# Patient Record
Sex: MALE | Race: WHITE | NOT HISPANIC OR LATINO | Employment: FULL TIME | URBAN - METROPOLITAN AREA
[De-identification: names, ages, dates, MRNs, and addresses within clinical notes are randomized per-mention and may not be internally consistent; named-entity substitution may affect disease eponyms.]

---

## 2017-01-20 ENCOUNTER — ALLSCRIPTS OFFICE VISIT (OUTPATIENT)
Dept: OTHER | Facility: OTHER | Age: 37
End: 2017-01-20

## 2017-01-20 DIAGNOSIS — N50.819 PAIN IN TESTICLE: ICD-10-CM

## 2017-01-20 LAB
BILIRUB UR QL STRIP: NORMAL
CLARITY UR: NORMAL
COLOR UR: CLEAR
GLUCOSE (HISTORICAL): NORMAL
HGB UR QL STRIP.AUTO: NORMAL
KETONES UR STRIP-MCNC: NORMAL MG/DL
LEUKOCYTE ESTERASE UR QL STRIP: NORMAL
NITRITE UR QL STRIP: NORMAL
PH UR STRIP.AUTO: 6.5 [PH]
PROT UR STRIP-MCNC: NORMAL MG/DL
SP GR UR STRIP.AUTO: 1

## 2017-01-23 ENCOUNTER — HOSPITAL ENCOUNTER (OUTPATIENT)
Dept: RADIOLOGY | Facility: HOSPITAL | Age: 37
Discharge: HOME | End: 2017-01-23
Payer: COMMERCIAL

## 2017-01-23 DIAGNOSIS — N50.819 PAIN IN TESTICLE: ICD-10-CM

## 2017-01-23 PROCEDURE — 76870 US EXAM SCROTUM: CPT

## 2017-02-09 ENCOUNTER — APPOINTMENT (OUTPATIENT)
Dept: PHYSICAL THERAPY | Facility: CLINIC | Age: 37
End: 2017-02-09
Payer: COMMERCIAL

## 2017-02-09 PROCEDURE — 97162 PT EVAL MOD COMPLEX 30 MIN: CPT

## 2017-02-09 PROCEDURE — L3010 FOOT LONGITUDINAL ARCH SUPPO: HCPCS

## 2017-06-07 ENCOUNTER — APPOINTMENT (OUTPATIENT)
Dept: LAB | Facility: CLINIC | Age: 37
End: 2017-06-07
Payer: COMMERCIAL

## 2017-06-07 ENCOUNTER — TRANSCRIBE ORDERS (OUTPATIENT)
Dept: LAB | Facility: CLINIC | Age: 37
End: 2017-06-07

## 2017-06-07 DIAGNOSIS — Z00.8 HEALTH EXAMINATION IN POPULATION SURVEY: Primary | ICD-10-CM

## 2017-06-07 LAB
CHOLEST SERPL-MCNC: 122 MG/DL (ref 50–200)
EST. AVERAGE GLUCOSE BLD GHB EST-MCNC: 103 MG/DL
HBA1C MFR BLD: 5.2 % (ref 4.2–6.3)
HDLC SERPL-MCNC: 59 MG/DL (ref 40–60)
LDLC SERPL CALC-MCNC: 55 MG/DL (ref 0–100)
TRIGL SERPL-MCNC: 42 MG/DL

## 2017-06-07 PROCEDURE — 80061 LIPID PANEL: CPT

## 2017-06-07 PROCEDURE — 83036 HEMOGLOBIN GLYCOSYLATED A1C: CPT

## 2017-06-07 PROCEDURE — 36415 COLL VENOUS BLD VENIPUNCTURE: CPT

## 2017-09-01 DIAGNOSIS — N50.819 PAIN IN TESTICLE: ICD-10-CM

## 2018-01-13 VITALS
WEIGHT: 192.38 LBS | HEART RATE: 64 BPM | HEIGHT: 73 IN | DIASTOLIC BLOOD PRESSURE: 82 MMHG | BODY MASS INDEX: 25.5 KG/M2 | SYSTOLIC BLOOD PRESSURE: 120 MMHG

## 2018-07-06 ENCOUNTER — TRANSCRIBE ORDERS (OUTPATIENT)
Dept: LAB | Facility: CLINIC | Age: 38
End: 2018-07-06

## 2018-07-06 ENCOUNTER — APPOINTMENT (OUTPATIENT)
Dept: LAB | Facility: CLINIC | Age: 38
End: 2018-07-06
Payer: COMMERCIAL

## 2018-07-06 DIAGNOSIS — Z00.8 HEALTH EXAMINATION IN POPULATION SURVEY: Primary | ICD-10-CM

## 2018-07-06 DIAGNOSIS — Z00.8 HEALTH EXAMINATION IN POPULATION SURVEY: ICD-10-CM

## 2018-07-06 LAB
CHOLEST SERPL-MCNC: 154 MG/DL (ref 50–200)
HDLC SERPL-MCNC: 65 MG/DL (ref 40–60)
LDLC SERPL CALC-MCNC: 79 MG/DL (ref 0–100)
NONHDLC SERPL-MCNC: 89 MG/DL
TRIGL SERPL-MCNC: 52 MG/DL

## 2018-07-06 PROCEDURE — 80061 LIPID PANEL: CPT

## 2018-07-06 PROCEDURE — 83036 HEMOGLOBIN GLYCOSYLATED A1C: CPT

## 2018-07-06 PROCEDURE — 36415 COLL VENOUS BLD VENIPUNCTURE: CPT

## 2018-07-07 LAB
EST. AVERAGE GLUCOSE BLD GHB EST-MCNC: 103 MG/DL
HBA1C MFR BLD: 5.2 % (ref 4.2–6.3)

## 2018-11-05 ENCOUNTER — OFFICE VISIT (OUTPATIENT)
Dept: URGENT CARE | Facility: CLINIC | Age: 38
End: 2018-11-05
Payer: COMMERCIAL

## 2018-11-05 VITALS
DIASTOLIC BLOOD PRESSURE: 66 MMHG | WEIGHT: 194 LBS | HEIGHT: 74 IN | OXYGEN SATURATION: 99 % | HEART RATE: 76 BPM | RESPIRATION RATE: 16 BRPM | TEMPERATURE: 99.9 F | SYSTOLIC BLOOD PRESSURE: 120 MMHG | BODY MASS INDEX: 24.9 KG/M2

## 2018-11-05 DIAGNOSIS — J02.9 ACUTE PHARYNGITIS, UNSPECIFIED ETIOLOGY: Primary | ICD-10-CM

## 2018-11-05 LAB — S PYO AG THROAT QL: NEGATIVE

## 2018-11-05 PROCEDURE — 99213 OFFICE O/P EST LOW 20 MIN: CPT | Performed by: PHYSICIAN ASSISTANT

## 2018-11-05 PROCEDURE — S9088 SERVICES PROVIDED IN URGENT: HCPCS | Performed by: PHYSICIAN ASSISTANT

## 2018-11-05 PROCEDURE — 87070 CULTURE OTHR SPECIMN AEROBIC: CPT | Performed by: PHYSICIAN ASSISTANT

## 2018-11-05 NOTE — PATIENT INSTRUCTIONS
Rapid strep negative in office, will send for culture and call if positive  Tylenol, ibuprofen, tea with honey, and plenty of fluids  Follow up with your PCP for persistent symptoms  Go to the ER for any distress

## 2018-11-05 NOTE — PROGRESS NOTES
[facility]        NAME: Zeyad Neri is a 41 y o  male  : 1980    MRN: [id number]  DATE: 2018  TIME: 3:47 PM    Assessment and Plan   Acute pharyngitis, unspecified etiology [J02 9]  1  Acute pharyngitis, unspecified etiology  POCT rapid strepA    Throat culture         Patient Instructions     Rapid strep negative in office, will send for culture and call if positive  Tylenol, ibuprofen, tea with honey, and plenty of fluids  Follow up with PCP in 3-5 days  Proceed to  ER if symptoms worsen  Chief Complaint     Chief Complaint   Patient presents with    Sore Throat     began 3 days ago; has taken no meds for symptom relief         History of Present Illness       This is a 41year old male presenting for sore throat x 3 days  Mild sinus congestion and dry cough, no shortness of breath or fevers  + hoarse voice  He has been taking zicam and tea with honey for his symptoms  His daughter was recently ill with a sore throat that resolved spontaneously in a few days  Review of Systems   Review of Systems   Constitutional: Positive for fatigue  Negative for fever  HENT: Positive for congestion, rhinorrhea and sore throat  Respiratory: Positive for cough  Current Medications     No current outpatient prescriptions on file  Current Allergies     Allergies as of 2018    (No Known Allergies)            The following portions of the patient's history were reviewed and updated as appropriate: allergies, current medications, past family history, past medical history, past social history, past surgical history and problem list      No past medical history on file  No past surgical history on file  No family history on file  Medications have been verified          Objective   /66   Pulse 76   Temp 99 9 °F (37 7 °C)   Resp 16   Ht 6' 2" (1 88 m)   Wt 88 kg (194 lb)   SpO2 99%   BMI 24 91 kg/m²        Physical Exam     Physical Exam Constitutional: He appears well-developed and well-nourished  No distress  HENT:   Head: Normocephalic  Right Ear: Tympanic membrane, external ear and ear canal normal    Left Ear: Tympanic membrane, external ear and ear canal normal    Nose: Nose normal    Mouth/Throat: Uvula is midline and mucous membranes are normal  Posterior oropharyngeal erythema present  No oropharyngeal exudate or posterior oropharyngeal edema  Eyes: Pupils are equal, round, and reactive to light  Conjunctivae and EOM are normal    Neck: Normal range of motion  Neck supple  Cardiovascular: Normal rate, regular rhythm, normal heart sounds and intact distal pulses  Pulmonary/Chest: Effort normal and breath sounds normal  No respiratory distress  He has no decreased breath sounds  He has no wheezes  He has no rhonchi  He has no rales  Lymphadenopathy:     He has no cervical adenopathy  Neurological: He is alert  Skin: Skin is warm and dry  He is not diaphoretic  Nursing note and vitals reviewed

## 2018-11-05 NOTE — LETTER
November 5, 2018     Patient: Donnie Jolly   YOB: 1980   Date of Visit: 11/5/2018       To Whom it May Concern:    Fartun Guerra was seen in my clinic on 11/5/2018  He may return to work on 11/05/2018  If you have any questions or concerns, please don't hesitate to call           Sincerely,          Girish Villareal PA-C        CC: No Recipients

## 2018-11-05 NOTE — LETTER
November 5, 2018     Patient: Liudmila Corrales   YOB: 1980   Date of Visit: 11/5/2018       To Whom it May Concern:    Luana Velasquez was seen in my clinic on 11/5/2018  He may return to work on 11/06/2018  If you have any questions or concerns, please don't hesitate to call           Sincerely,          Hamilton Montiel PA-C        CC: No Recipients

## 2018-11-07 LAB — BACTERIA THROAT CULT: NORMAL

## 2020-05-29 ENCOUNTER — OFFICE VISIT (OUTPATIENT)
Dept: FAMILY MEDICINE CLINIC | Facility: CLINIC | Age: 40
End: 2020-05-29
Payer: COMMERCIAL

## 2020-05-29 VITALS
WEIGHT: 189 LBS | DIASTOLIC BLOOD PRESSURE: 80 MMHG | TEMPERATURE: 97.4 F | HEART RATE: 96 BPM | RESPIRATION RATE: 14 BRPM | HEIGHT: 73 IN | BODY MASS INDEX: 25.05 KG/M2 | SYSTOLIC BLOOD PRESSURE: 110 MMHG

## 2020-05-29 DIAGNOSIS — Z13.220 SCREENING, LIPID: ICD-10-CM

## 2020-05-29 DIAGNOSIS — Z00.00 PE (PHYSICAL EXAM), ANNUAL: Primary | ICD-10-CM

## 2020-05-29 DIAGNOSIS — Z13.1 SCREENING FOR DIABETES MELLITUS (DM): ICD-10-CM

## 2020-05-29 DIAGNOSIS — R53.83 FATIGUE, UNSPECIFIED TYPE: ICD-10-CM

## 2020-05-29 PROCEDURE — 99395 PREV VISIT EST AGE 18-39: CPT | Performed by: NURSE PRACTITIONER

## 2020-06-10 ENCOUNTER — TELEPHONE (OUTPATIENT)
Dept: FAMILY MEDICINE CLINIC | Facility: CLINIC | Age: 40
End: 2020-06-10

## 2020-12-21 ENCOUNTER — IMMUNIZATIONS (OUTPATIENT)
Dept: FAMILY MEDICINE CLINIC | Facility: HOSPITAL | Age: 40
End: 2020-12-21
Payer: COMMERCIAL

## 2020-12-21 DIAGNOSIS — Z23 ENCOUNTER FOR IMMUNIZATION: ICD-10-CM

## 2020-12-21 PROCEDURE — 0001A SARS-COV-2 / COVID-19 MRNA VACCINE (PFIZER-BIONTECH) 30 MCG: CPT

## 2020-12-21 PROCEDURE — 91300 SARS-COV-2 / COVID-19 MRNA VACCINE (PFIZER-BIONTECH) 30 MCG: CPT

## 2021-01-09 ENCOUNTER — IMMUNIZATIONS (OUTPATIENT)
Dept: FAMILY MEDICINE CLINIC | Facility: HOSPITAL | Age: 41
End: 2021-01-09

## 2021-01-09 DIAGNOSIS — Z23 ENCOUNTER FOR IMMUNIZATION: ICD-10-CM

## 2021-01-09 PROCEDURE — 0002A SARS-COV-2 / COVID-19 MRNA VACCINE (PFIZER-BIONTECH) 30 MCG: CPT

## 2021-01-09 PROCEDURE — 91300 SARS-COV-2 / COVID-19 MRNA VACCINE (PFIZER-BIONTECH) 30 MCG: CPT

## 2021-04-30 ENCOUNTER — OFFICE VISIT (OUTPATIENT)
Dept: FAMILY MEDICINE CLINIC | Facility: CLINIC | Age: 41
End: 2021-04-30
Payer: COMMERCIAL

## 2021-04-30 ENCOUNTER — TELEPHONE (OUTPATIENT)
Dept: FAMILY MEDICINE CLINIC | Facility: CLINIC | Age: 41
End: 2021-04-30

## 2021-04-30 ENCOUNTER — APPOINTMENT (OUTPATIENT)
Dept: LAB | Facility: CLINIC | Age: 41
End: 2021-04-30
Payer: COMMERCIAL

## 2021-04-30 VITALS
TEMPERATURE: 97.8 F | BODY MASS INDEX: 26.14 KG/M2 | RESPIRATION RATE: 16 BRPM | SYSTOLIC BLOOD PRESSURE: 120 MMHG | OXYGEN SATURATION: 99 % | DIASTOLIC BLOOD PRESSURE: 80 MMHG | HEART RATE: 62 BPM | WEIGHT: 197.2 LBS | HEIGHT: 73 IN

## 2021-04-30 DIAGNOSIS — Z00.00 PHYSICAL EXAM: Primary | ICD-10-CM

## 2021-04-30 DIAGNOSIS — Z13.1 SCREENING FOR DIABETES MELLITUS (DM): ICD-10-CM

## 2021-04-30 DIAGNOSIS — R53.83 FATIGUE, UNSPECIFIED TYPE: ICD-10-CM

## 2021-04-30 DIAGNOSIS — Z13.220 SCREENING, LIPID: ICD-10-CM

## 2021-04-30 LAB
ALBUMIN SERPL BCP-MCNC: 3.9 G/DL (ref 3.5–5)
ALP SERPL-CCNC: 81 U/L (ref 46–116)
ALT SERPL W P-5'-P-CCNC: 20 U/L (ref 12–78)
ANION GAP SERPL CALCULATED.3IONS-SCNC: 4 MMOL/L (ref 4–13)
AST SERPL W P-5'-P-CCNC: 8 U/L (ref 5–45)
BASOPHILS # BLD AUTO: 0.05 THOUSANDS/ΜL (ref 0–0.1)
BASOPHILS NFR BLD AUTO: 1 % (ref 0–1)
BILIRUB SERPL-MCNC: 0.71 MG/DL (ref 0.2–1)
BUN SERPL-MCNC: 16 MG/DL (ref 5–25)
CALCIUM SERPL-MCNC: 9.2 MG/DL (ref 8.3–10.1)
CHLORIDE SERPL-SCNC: 107 MMOL/L (ref 100–108)
CHOLEST SERPL-MCNC: 128 MG/DL (ref 50–200)
CO2 SERPL-SCNC: 28 MMOL/L (ref 21–32)
CREAT SERPL-MCNC: 1.01 MG/DL (ref 0.6–1.3)
EOSINOPHIL # BLD AUTO: 0.09 THOUSAND/ΜL (ref 0–0.61)
EOSINOPHIL NFR BLD AUTO: 2 % (ref 0–6)
ERYTHROCYTE [DISTWIDTH] IN BLOOD BY AUTOMATED COUNT: 12.5 % (ref 11.6–15.1)
EST. AVERAGE GLUCOSE BLD GHB EST-MCNC: 100 MG/DL
GFR SERPL CREATININE-BSD FRML MDRD: 93 ML/MIN/1.73SQ M
GLUCOSE P FAST SERPL-MCNC: 94 MG/DL (ref 65–99)
HBA1C MFR BLD: 5.1 %
HCT VFR BLD AUTO: 47.6 % (ref 36.5–49.3)
HDLC SERPL-MCNC: 57 MG/DL
HGB BLD-MCNC: 15.3 G/DL (ref 12–17)
IMM GRANULOCYTES # BLD AUTO: 0.02 THOUSAND/UL (ref 0–0.2)
IMM GRANULOCYTES NFR BLD AUTO: 0 % (ref 0–2)
LDLC SERPL CALC-MCNC: 62 MG/DL (ref 0–100)
LYMPHOCYTES # BLD AUTO: 1.66 THOUSANDS/ΜL (ref 0.6–4.47)
LYMPHOCYTES NFR BLD AUTO: 34 % (ref 14–44)
MCH RBC QN AUTO: 29.5 PG (ref 26.8–34.3)
MCHC RBC AUTO-ENTMCNC: 32.1 G/DL (ref 31.4–37.4)
MCV RBC AUTO: 92 FL (ref 82–98)
MONOCYTES # BLD AUTO: 0.43 THOUSAND/ΜL (ref 0.17–1.22)
MONOCYTES NFR BLD AUTO: 9 % (ref 4–12)
NEUTROPHILS # BLD AUTO: 2.71 THOUSANDS/ΜL (ref 1.85–7.62)
NEUTS SEG NFR BLD AUTO: 54 % (ref 43–75)
NONHDLC SERPL-MCNC: 71 MG/DL
NRBC BLD AUTO-RTO: 0 /100 WBCS
PLATELET # BLD AUTO: 184 THOUSANDS/UL (ref 149–390)
PMV BLD AUTO: 10.9 FL (ref 8.9–12.7)
POTASSIUM SERPL-SCNC: 4.6 MMOL/L (ref 3.5–5.3)
PROT SERPL-MCNC: 7.2 G/DL (ref 6.4–8.2)
RBC # BLD AUTO: 5.19 MILLION/UL (ref 3.88–5.62)
SL AMB  POCT GLUCOSE, UA: NORMAL
SL AMB LEUKOCYTE ESTERASE,UA: NORMAL
SL AMB POCT BILIRUBIN,UA: NORMAL
SL AMB POCT BLOOD,UA: NORMAL
SL AMB POCT CLARITY,UA: CLEAR
SL AMB POCT COLOR,UA: NORMAL
SL AMB POCT KETONES,UA: NORMAL
SL AMB POCT NITRITE,UA: NORMAL
SL AMB POCT PH,UA: 6.5
SL AMB POCT SPECIFIC GRAVITY,UA: 1
SL AMB POCT URINE PROTEIN: NORMAL
SL AMB POCT UROBILINOGEN: 0.2
SODIUM SERPL-SCNC: 139 MMOL/L (ref 136–145)
TRIGL SERPL-MCNC: 47 MG/DL
TSH SERPL DL<=0.05 MIU/L-ACNC: 2.93 UIU/ML (ref 0.36–3.74)
WBC # BLD AUTO: 4.96 THOUSAND/UL (ref 4.31–10.16)

## 2021-04-30 PROCEDURE — 84443 ASSAY THYROID STIM HORMONE: CPT

## 2021-04-30 PROCEDURE — 83036 HEMOGLOBIN GLYCOSYLATED A1C: CPT

## 2021-04-30 PROCEDURE — 80061 LIPID PANEL: CPT

## 2021-04-30 PROCEDURE — 81003 URINALYSIS AUTO W/O SCOPE: CPT | Performed by: NURSE PRACTITIONER

## 2021-04-30 PROCEDURE — 36415 COLL VENOUS BLD VENIPUNCTURE: CPT

## 2021-04-30 PROCEDURE — 99395 PREV VISIT EST AGE 18-39: CPT | Performed by: NURSE PRACTITIONER

## 2021-04-30 PROCEDURE — 85025 COMPLETE CBC W/AUTO DIFF WBC: CPT

## 2021-04-30 PROCEDURE — 80053 COMPREHEN METABOLIC PANEL: CPT

## 2021-04-30 NOTE — PROGRESS NOTES
FAMILY PRACTICE OFFICE VISIT       NAME: Dana Waterman  AGE: 45 y o  SEX: male       : 1980        MRN: [id number]      Assessment and Plan     Problem List Items Addressed This Visit     None      Visit Diagnoses     Physical exam    -  Primary        1  Physical exam       Healthy-appearing 23-year-old male  Up-to-date on Tdap, influenza, COVID-19 vaccinations  Tdap   Blood work completed today including hemoglobin A1c, lipid panel, CMP, CBC with diff, TSH  Up-to-date on dental exam, and vision exam   He exercises regularly, and enjoys participating half marathons  Follow up 1 year for annual physical or sooner if needed  Chief Complaint     Chief Complaint   Patient presents with    Well Check       History of Present Illness     Dana Waterman is a 23-year-old male presenting today for annual exam to establish care at our office  Denies any  Significant past medical history  Past surgical history includes vasectomy  No hearing problems  Vision:  Wears contact lenses  Up-to-date on eye exam     Up-to-date on dental exams every 6 months  Exercises 30 minutes walking at least 5 days per week  Stationary bike riding 2 times per week  Does enjoy participating in half marathons  Review of Systems   Review of Systems   Constitutional: Negative  HENT: Negative  Eyes: Negative for visual disturbance  Respiratory: Negative  Cardiovascular: Negative  Gastrointestinal: Negative  Genitourinary: Negative  Musculoskeletal: Negative  Skin: Negative  Allergic/Immunologic: Positive for environmental allergies  Neurological: Negative  Hematological: Negative  Psychiatric/Behavioral: Negative  Active Problem List   There is no problem list on file for this patient  Past Medical History:  No past medical history on file      Past Surgical History:  Past Surgical History:   Procedure Laterality Date    VASECTOMY Family History:  Family History   Problem Relation Age of Onset    No Known Problems Mother     No Known Problems Father     Hearing loss Maternal Grandfather     Arthritis Paternal Grandmother        Social History:  Social History     Socioeconomic History    Marital status: /Civil Union     Spouse name: Not on file    Number of children: Not on file    Years of education: Not on file    Highest education level: Not on file   Occupational History    Not on file   Social Needs    Financial resource strain: Not on file    Food insecurity     Worry: Not on file     Inability: Not on file   [employer] needs     Medical: Not on file     Non-medical: Not on file   Tobacco Use    Smoking status: Never Smoker    Smokeless tobacco: Never Used   Substance and Sexual Activity    Alcohol use:  Yes     Alcohol/week: 2 0 standard drinks     Types: 2 Cans of beer per week     Comment: Social     Drug use: Never    Sexual activity: Yes     Partners: Female     Birth control/protection: Male Sterilization   Lifestyle    Physical activity     Days per week: Not on file     Minutes per session: Not on file    Stress: Not on file   Relationships    Social connections     Talks on phone: Not on file     Gets together: Not on file     Attends [employer] service: Not on file     Active member of club or organization: Not on file     Attends meetings of clubs or organizations: Not on file     Relationship status: Not on file    Intimate partner violence     Fear of current or ex partner: Not on file     Emotionally abused: Not on file     Physically abused: Not on file     Forced sexual activity: Not on file   Other Topics Concern    Not on file   Social History Narrative    Caffeine use    Uses safety equipment    Uses safety equipment - Seatbelts        I have reviewed the patient's medical history in detail; there are no changes to the history as noted in the electronic medical record  Objective     Vitals:    04/30/21 0752   BP: 120/80   Pulse: 62   Resp: 16   Temp: 97 8 °F (36 6 °C)   TempSrc: Temporal   SpO2: 99%   Weight: 89 4 kg (197 lb 3 2 oz)   Height: 6' 1" (1 854 m)     Wt Readings from Last 3 Encounters:   04/30/21 89 4 kg (197 lb 3 2 oz)   05/29/20 85 7 kg (189 lb)   11/05/18 88 kg (194 lb)     PHQ-9 Depression Screening    PHQ-9:   Frequency of the following problems over the past two weeks:      Little interest or pleasure in doing things: 0 - not at all  Feeling down, depressed, or hopeless: 0 - not at all  PHQ-2 Score: 0         Physical Exam  Vitals signs and nursing note reviewed  Constitutional:       General: He is not in acute distress  Appearance: Normal appearance  He is well-developed  He is not ill-appearing, toxic-appearing or diaphoretic  HENT:      Head: Normocephalic and atraumatic  Right Ear: Tympanic membrane and ear canal normal       Left Ear: Tympanic membrane and ear canal normal    Eyes:      Conjunctiva/sclera: Conjunctivae normal       Pupils: Pupils are equal, round, and reactive to light  Neck:      Musculoskeletal: Normal range of motion and neck supple  Thyroid: No thyromegaly  Cardiovascular:      Rate and Rhythm: Normal rate and regular rhythm  Heart sounds: No murmur  Pulmonary:      Effort: Pulmonary effort is normal       Breath sounds: Normal breath sounds  Abdominal:      General: Bowel sounds are normal       Palpations: Abdomen is soft  Musculoskeletal:      Right lower leg: No edema  Left lower leg: No edema  Lymphadenopathy:      Cervical: No cervical adenopathy  Skin:     Findings: No rash  Neurological:      Mental Status: He is alert and oriented to person, place, and time  Psychiatric:         Mood and Affect: Mood normal        BMI Counseling: Body mass index is 26 02 kg/m²  The BMI is above normal  Exercise recommendations include exercising 3-5 times per week  ALLERGIES:  No Known Allergies    Current Medications     No current outpatient medications on file  No current facility-administered medications for this visit            Health Maintenance     Health Maintenance   Topic Date Due    HIV Screening  Never done    BMI: Followup Plan  Never done    Annual Physical  05/29/2021    Depression Screening PHQ  04/30/2022    BMI: Adult  04/30/2022    DTaP,Tdap,and Td Vaccines (2 - Td) 06/06/2022    Influenza Vaccine  Completed    COVID-19 Vaccine  Completed    Pneumococcal Vaccine: Pediatrics (0 to 5 Years) and At-Risk Patients (6 to 59 Years)  Aged Out    HIB Vaccine  Aged Out    Hepatitis B Vaccine  Aged Out    IPV Vaccine  Aged Out    Hepatitis A Vaccine  Aged Out    Meningococcal ACWY Vaccine  Aged Out    HPV Vaccine  Aged [employer] History   Administered Date(s) Administered    INFLUENZA 10/30/2018, 10/02/2020    Influenza, seasonal, injectable 10/02/2013    SARS-CoV-2 / COVID-19 mRNA IM ([facility]) 12/21/2020, 01/09/2021    Tdap 06/06/2012       KWAME Dozier

## 2021-04-30 NOTE — TELEPHONE ENCOUNTER
----- Message from [facility] sent at 4/30/2021  3:31 PM EDT -----  Please let Kacy Lindquist know all blood work is good

## 2021-08-10 ENCOUNTER — OFFICE VISIT (OUTPATIENT)
Dept: PHYSICAL THERAPY | Facility: REHABILITATION | Age: 41
End: 2021-08-10
Payer: COMMERCIAL

## 2021-08-10 DIAGNOSIS — G89.29 CHRONIC BILATERAL LOW BACK PAIN WITHOUT SCIATICA: Primary | ICD-10-CM

## 2021-08-10 DIAGNOSIS — M54.50 CHRONIC BILATERAL LOW BACK PAIN WITHOUT SCIATICA: Primary | ICD-10-CM

## 2021-08-10 PROCEDURE — 97112 NEUROMUSCULAR REEDUCATION: CPT | Performed by: PHYSICAL THERAPIST

## 2021-08-10 PROCEDURE — 97162 PT EVAL MOD COMPLEX 30 MIN: CPT | Performed by: PHYSICAL THERAPIST

## 2021-08-10 PROCEDURE — 97140 MANUAL THERAPY 1/> REGIONS: CPT | Performed by: PHYSICAL THERAPIST

## 2021-08-10 NOTE — PROGRESS NOTES
PT Evaluation     Today's date: 8/10/2021  Patient name: Peg Neri  : 1980  MRN: [id number]  Referring provider: Eloina Pascual PT  Dx:   Encounter Diagnosis     ICD-10-CM    1  Chronic bilateral low back pain without sciatica  M54 5     G89 29        Start Time: 1145  Stop Time: 1245  Total time in clinic (min): 60 minutes    Assessment  Assessment details: Peg Neri is a 45 y o  male presenting to outpatient physical therapy on 08/10/21 with referral from [facility] for non radicular CLBP  MSIS consistent with LS extension syndrome with potential dx for ERS of R side  Upon evaluation, Glenis Barnes demonstrates impaired mobility throughout upper LS and TLJ with increased strain place around L3  Length deficits noted in both hips with + kalpana and PKB testing as well as HHR  Poor ability to engage gluteals without LS compensation and PPT  The listed impairments and functional limitation are effecting Glenis Barnes ability to function at prior level  They can continue to benefit from physical therapy services at this times in order to address the above discussed impairments and functional limitation in order to allow for a return to premorbid status      HEP: HHR, hip ext mobility at wall, prone knee flexion with core control, bridge    Impairments: abnormal gait, abnormal muscle firing, abnormal muscle tone, abnormal or restricted ROM, abnormal movement, activity intolerance, impaired physical strength and pain with function  Understanding of Dx/Px/POC: good   Prognosis: good    Plan  Patient would benefit from: skilled PT  Planned therapy interventions: joint mobilization, manual therapy, ADL training, neuromuscular re-education, home exercise program, therapeutic exercise, therapeutic activities, strengthening, patient education and functional ROM exercises  Frequency: 2x week  Duration in weeks: 4  Plan of Care beginning date: 8/10/2021  Plan of Care expiration date: 2021  Treatment plan discussed with: patient        Subjective Evaluation    History of Present Illness  Mechanism of injury: Mya Mendez is a 45 y o  male presenting to physical therapy on 08/10/21 with referral from [facility] for CLBP  Patient reports that his pain began about 2 years ago after he dove playing flag football and has been chronic since  In [id number] dx with L3 HNP - no MRI  + pain with sneeze  No radicular symptoms reports or pain into groin, up LS into TS  Denies bowl bladder dysfunction  Pain is mainly felt with flexion based activity or prolonged positioning of his LS in awkward positions (sitting on couch slouched)  States that he will get a catch of pain when performing forward flexion  Limited at times with recreational activity such as golfing and running                Recurrent probem    Quality of life: good    Pain  Current pain rating: 3  At worst pain ratin  Location: lower LS -L2-4 region -central      Diagnostic Tests  No diagnostic tests performed  Treatments  No previous or current treatments  Patient Goals  Patient goals for therapy: decreased pain, increased strength and return to sport/leisure activities      Short Term Goals:   1  Patient will be Independent with hep  2  Patient will improve pain with activity by 50%  3  Patient will have pain free LS AROM  4  Patient will improve PKB witohut LS compensation (to 90 deg)      Long Term Goals:   1  Patient will improve FOTO to greater then goal  2  Patient will improve pain with activity to 2/10 or less  3  Patient will continue with HEP independence to allow for decreased future reoccurrence of pain and loss in function  4  Patient will squat without pain or pull in LS/pelvis  5   Patient will perform forward bending activity without catch or pain        Objective    Flowsheet Rows      Most Recent Value   PT/OT G-Codes   Current Score  56   Projected Score  70         Posture: Flat lower LS, increased lordosis mid LS  Palpation: TTP R psoas  Myotomes (L/R): normal LE  Dermatome: (pinprick- L/R):  Normal LE     Reflexes:  (L/R) L3-4: 1+ B/L       S1: 2+ B/L          Clonus= 1 beat    GAIT: lacking hip ext w/o LS ext  Squat assess: 50%, early posterior pelvic til (wink)      Lumbar Spine Protective Mechanism: normal      Lumbar  % of normal   Flex  76   Extn  75   SB Left 75   SB Right 75   ROT Left 75   ROT Right 75   Hinge noted L3 with ext, SB**        MMT         AROM          PROM    Hip       L       R        L           R      L     R   Flex  Extn  Abd  Add  IR      20 20   ER  40 50            G  Max 4 4       G  Med  4 4       Iliop               Neuro Dynamic Testing:  Slump test: L=   neg  R=   neg        Pelvic Motor Control:    Transverse abdominis = Good  Multifidus Activation = Good  Clamshell = good form, no noted LS extension    Functional Testing:  Hand Heel Rock: high on R      SI joint:          Provocation testing: Compression= neg    Distraction= neg   Flick=hypomobile L SIJ    [place] scan=  3/4 for L AI    Hip:       90/90 Hamstring length=positive B/L  prone knee flexion=       positive B/L for LS compensation (R> L)          kalpana test= positive B/L           Segmental mobility:   LS=  Hypermobile L3, Hypomobile T12-L2                     Precautions: standard      Manuals 8/10            B/L hip LAD G5            R psoas STM 3'            Gapping TLJ R G4            Gapping L5 G4            Neuro Re-Ed 8/10            HHR 10 - TC            Standing hip ext slider with LS/core control 10x ea side            Prone knee flexion with LS/core control 10x ea side            Bridge 2x5            redcord fwd lean             redcord supine lift                          Ther Ex             Self TS mob             Self TS rot @ wall / qped                                                                                           Ther Activity                                       Gait Training Modalities

## 2021-08-16 ENCOUNTER — OFFICE VISIT (OUTPATIENT)
Dept: PHYSICAL THERAPY | Facility: REHABILITATION | Age: 41
End: 2021-08-16
Payer: COMMERCIAL

## 2021-08-16 DIAGNOSIS — M54.50 CHRONIC BILATERAL LOW BACK PAIN WITHOUT SCIATICA: Primary | ICD-10-CM

## 2021-08-16 DIAGNOSIS — G89.29 CHRONIC BILATERAL LOW BACK PAIN WITHOUT SCIATICA: Primary | ICD-10-CM

## 2021-08-16 PROCEDURE — 97112 NEUROMUSCULAR REEDUCATION: CPT | Performed by: PHYSICAL THERAPIST

## 2021-08-16 PROCEDURE — 97140 MANUAL THERAPY 1/> REGIONS: CPT | Performed by: PHYSICAL THERAPIST

## 2021-08-16 NOTE — PROGRESS NOTES
Daily Note     Today's date: 2021  Patient name: Garett Calzada  : 1980  MRN: [id number]  Referring provider: Andrew Kearney, PT  Dx:   Encounter Diagnosis     ICD-10-CM    1  Chronic bilateral low back pain without sciatica  M54 5     G89 29        Start Time: 1401  Stop Time: 1455  Total time in clinic (min): 54 minutes    Subjective: Patient reports doing well overall, was sore from sitting a lot and driving last Wednesday  Has kept up with HEP and per patient has felt good, ran 8 miles since last session and felt well  Objective: See treatment diary below  Hypomobile TLJ  Hypomobile hip ER B/L      Assessment: Tolerated treatment well  Patient with 0 D b/l redcord lift for pelvic drop which he was able to correct within session  Progressed HEP with bridge with march as well as TLJ self mobilizations  Plan: Continue per plan of care        Precautions: standard      Manuals 8/10 8/16           B/L hip LAD G5            R psoas STM 3'            assessment  2'           MWM ER  10x, CR into end ragne           Gapping TLJ R G4 supine PA mob - G5           Gapping L5 G4            Neuro Re-Ed 8/10 8/16           HHR 10 - TC            Standing hip ext slider with LS/core control 10x ea side            Prone knee flexion with LS/core control 10x ea side            Bridge 2x5 With march  10x           redcord fwd lean  Bent elbow 2 rds, alt arm - 2 rds           redcord supine lift  bungex1, UL rope, 4 rds ea side                        Ther Ex             Self TS mob  Belt - ext 10x           Self TS rot @ wall / qped  10x ea side                                                                                         Ther Activity                                       Gait Training                                       Modalities

## 2021-08-19 ENCOUNTER — OFFICE VISIT (OUTPATIENT)
Dept: PHYSICAL THERAPY | Facility: REHABILITATION | Age: 41
End: 2021-08-19
Payer: COMMERCIAL

## 2021-08-19 DIAGNOSIS — G89.29 CHRONIC BILATERAL LOW BACK PAIN WITHOUT SCIATICA: Primary | ICD-10-CM

## 2021-08-19 DIAGNOSIS — M54.50 CHRONIC BILATERAL LOW BACK PAIN WITHOUT SCIATICA: Primary | ICD-10-CM

## 2021-08-19 PROCEDURE — 97112 NEUROMUSCULAR REEDUCATION: CPT | Performed by: PHYSICAL THERAPIST

## 2021-08-19 PROCEDURE — 97140 MANUAL THERAPY 1/> REGIONS: CPT | Performed by: PHYSICAL THERAPIST

## 2021-08-19 NOTE — PROGRESS NOTES
Daily Note     Today's date: 2021  Patient name: Garett Calzada  : 1980  MRN: [id number]  Referring provider: Andrew Kearney, PT  Dx:   Encounter Diagnosis     ICD-10-CM    1  Chronic bilateral low back pain without sciatica  M54 5     G89 29        Start Time: 1215  Stop Time: 1310  Total time in clinic (min): 55 minutes    Subjective: Patient reports that he has been feeling good overall, the real test come when he had to quick react when playing catch with his son, having to bend and twist to the right  Is more hesitant then anything to try  Objective: See treatment diary below  Hypomobile L4-5 on L  Limited R rot   Catch with flexion    Assessment: Tolerated treatment well  Patient with resolved catch with flexion post re-ed, normalized R rot  Perfored well with redcord lifts, able to correct form with min cues  HEP updated with kneeling plank with alt arm movement for anti rotation training/multif training  Also added depth squat for time to improve function, hip mobility  Plan: Continue per plan of care        Precautions: standard      Manuals 8/10 8/16 8/19          B/L hip LAD G5            R psoas STM 3'            assessment  2' 2'          MWM ER  10x, CR into end ragne           Traction SLR   3 reps ea side          Gapping TLJ R G4 supine PA mob - G5 G4 rot          Gapping L5 G4  L3-4 on R - G4          Neuro Re-Ed 8/10 8/16 8/19          HHR 10 - TC            Squat to depth   30"x2          Standing hip ext slider with LS/core control 10x ea side            Prone knee flexion with LS/core control 10x ea side            Bridge 2x5 With march  10x           redcord fwd lean  Bent elbow 2 rds, alt arm - 2 rds NP          Kneeling mod plank - alt arm mvmt   2'          redcord supine bridge with HS   3x5  bungex2  B/L rope          Prone bridge   Core training - 3'  Hip ABD, knee flexion          redcord supine lift  bungex1, UL rope, 4 rds ea side UL rope  3 rds ea side Ther Ex             Self TS mob  Belt - ext 10x           Self TS rot @ wall / qped  10x ea side                                                                                         Ther Activity                                       Gait Training                                       Modalities

## 2021-08-24 ENCOUNTER — OFFICE VISIT (OUTPATIENT)
Dept: PHYSICAL THERAPY | Facility: REHABILITATION | Age: 41
End: 2021-08-24
Payer: COMMERCIAL

## 2021-08-24 DIAGNOSIS — M54.50 CHRONIC BILATERAL LOW BACK PAIN WITHOUT SCIATICA: Primary | ICD-10-CM

## 2021-08-24 DIAGNOSIS — G89.29 CHRONIC BILATERAL LOW BACK PAIN WITHOUT SCIATICA: Primary | ICD-10-CM

## 2021-08-24 PROCEDURE — 97140 MANUAL THERAPY 1/> REGIONS: CPT | Performed by: PHYSICAL THERAPIST

## 2021-08-24 PROCEDURE — 97112 NEUROMUSCULAR REEDUCATION: CPT | Performed by: PHYSICAL THERAPIST

## 2021-08-24 NOTE — PROGRESS NOTES
Daily Note     Today's date: 2021  Patient name: Evelia Torres  : 1980  MRN: [id number]  Referring provider: Heidy Treadwell, KARL  Dx:   Encounter Diagnosis     ICD-10-CM    1  Chronic bilateral low back pain without sciatica  M54 5     G89 29        Start Time: 1130  Stop Time: 1220  Total time in clinic (min): 50 minutes    Subjective: Patient reports that he has felt better with bending forward (ex: into bread drawer)  Will still feel some pain if in prolonged sitting (driving)      Objective: See treatment diary below  LS AROM full, catch with flexion on first movement that improves with repeated movements forward   + tone increase LS paraspinals on R side    Assessment: Tolerated treatment well  Patient 0D with L SL hip ABD on redcord, functional on R  Overall he had good core engagement, min cues provided with ABD lifts  Challenged with frontal plane/lateral line and antirotation movements  Plan: Continue per plan of care        Precautions: standard      Manuals 8/10 8/16 8/19 8/24         B/L hip LAD G5            R psoas STM 3'            assessment  2' 2' 2'         MWM ER  10x, CR into end ragne           Traction SLR   3 reps ea side          Gapping TLJ R G4 supine PA mob - G5 G4 rot          STM LS paraspinals on R/ FMP with HHR    5'         Gapping L5 G4  L3-4 on R - G4 L3-4 on R G4         Neuro Re-Ed 8/10 8/16 8/19          HHR 10 - TC            Squat to depth   30"x2 1x         Standing hip ext slider with LS/core control 10x ea side            Prone knee flexion with LS/core control 10x ea side            Bridge 2x5 With march  10x           redcord fwd lean  Bent elbow 2 rds, alt arm - 2 rds NP Alt bent elbow - 3 rds         Kneeling mod plank - alt arm mvmt   2'          redcord supine bridge with HS   3x5  bungex2  B/L rope          Prone bridge   Core training - 3'  Hip ABD, knee flexion          redcord supine lift  bungex1, UL rope, 4 rds ea side UL rope  3 rds ea side palloff press out with step up (outside leg)   2x10 ea side  6" step          redcord SL hip ABD   bungex1 - UL rope on R, top assist on L - 5 rds ea side          Ther Ex             Self TS mob  Belt - ext 10x           Self TS rot @ wall / qped  10x ea side                                                                                         Ther Activity                                       Gait Training                                       Modalities

## 2021-08-31 ENCOUNTER — OFFICE VISIT (OUTPATIENT)
Dept: PHYSICAL THERAPY | Facility: REHABILITATION | Age: 41
End: 2021-08-31
Payer: COMMERCIAL

## 2021-08-31 DIAGNOSIS — G89.29 CHRONIC BILATERAL LOW BACK PAIN WITHOUT SCIATICA: Primary | ICD-10-CM

## 2021-08-31 DIAGNOSIS — M54.50 CHRONIC BILATERAL LOW BACK PAIN WITHOUT SCIATICA: Primary | ICD-10-CM

## 2021-08-31 PROCEDURE — 97140 MANUAL THERAPY 1/> REGIONS: CPT | Performed by: PHYSICAL THERAPIST

## 2021-08-31 PROCEDURE — 97112 NEUROMUSCULAR REEDUCATION: CPT | Performed by: PHYSICAL THERAPIST

## 2021-08-31 NOTE — PROGRESS NOTES
Daily Note     Today's date: 2021  Patient name: Suyapa Baeza  : 1980  MRN: [id number]  Referring provider: Tono Abbasi PT  Dx:   Encounter Diagnosis     ICD-10-CM    1  Chronic bilateral low back pain without sciatica  M54 5     G89 29        Start Time: 1200  Stop Time: 1300  Total time in clinic (min): 60 minutes    Subjective: Patient reports feeling well overall, states that he did try some movement into flexion and R rotation as to catch a low throw (baseball) and felt well  Less catching with flexion  Did have some L gluteal pain after run this weekend which improved after 24 hours and repeated ext based movements      Objective: See treatment diary below  LS AROM full and pain free  Initial catch with flexion (1 rep) that was abolished post R psoas Re-ed  3+/5 R psoas with + LBP initially that was improved to 4+/5 post re-ed and pain free    Assessment: Tolerated treatment well  Patient with good form on redcord movements, min cues needed  Progressed with psoas re-ed in standing which he initially had difficulty with when going from full hip flexioing into extension (last 25% of movement)  Plan: Continue per plan of care        Precautions: standard      Manuals 8/10 8/16 8/19 8/24 8/31        B/L hip LAD G5            R psoas STM 3'            assessment  2' 2' 2' 10'        MWM ER  10x, CR into end ragne           Traction SLR   3 reps ea side          Gapping TLJ R G4 supine PA mob - G5 G4 rot          STM LS paraspinals on R/ FMP with HHR    5'         Gapping L5 G4  L3-4 on R - G4 L3-4 on R G4         Neuro Re-Ed 8/10 8/16 8/19  8/31        HHR 10 - TC            Squat to depth   30"x2 1x         Standing hip ext slider with LS/core control 10x ea side            Prone knee flexion with LS/core control 10x ea side            Bridge 2x5 With march  10x           redcord fwd lean  Bent elbow 2 rds, alt arm - 2 rds NP Alt bent elbow - 3 rds         Kneeling mod plank - alt arm mvmt   2'          redcord supine bridge with HS   3x5  bungex2  B/L rope          Prone bridge   Core training - 3'  Hip ABD, knee flexion  Active hip flex mobility 3 rd ea side, Prone bridge w/ bunge x1 and UL rope/UL bunge 3 rds        redcord supine lift  bungex1, UL rope, 4 rds ea side UL rope  3 rds ea side          Standing psoas re-ed     OJB  3x8-10 R, 10x L        palloff press out with step up (outside leg)   2x10 ea side  6" step          redcord SL hip ABD   bungex1 - UL rope on R, top assist on L - 5 rds ea side  bunge x1 - 5 rds ea side        Ther Ex             Self TS mob  Belt - ext 10x           Self TS rot @ wall / qped  10x ea side                                                                                         Ther Activity                                       Gait Training                                       Modalities

## 2021-09-08 ENCOUNTER — OFFICE VISIT (OUTPATIENT)
Dept: PHYSICAL THERAPY | Facility: REHABILITATION | Age: 41
End: 2021-09-08
Payer: COMMERCIAL

## 2021-09-08 DIAGNOSIS — M54.50 CHRONIC BILATERAL LOW BACK PAIN WITHOUT SCIATICA: Primary | ICD-10-CM

## 2021-09-08 DIAGNOSIS — G89.29 CHRONIC BILATERAL LOW BACK PAIN WITHOUT SCIATICA: Primary | ICD-10-CM

## 2021-09-08 PROCEDURE — 97112 NEUROMUSCULAR REEDUCATION: CPT | Performed by: PHYSICAL THERAPIST

## 2021-09-08 PROCEDURE — 97140 MANUAL THERAPY 1/> REGIONS: CPT | Performed by: PHYSICAL THERAPIST

## 2021-09-08 NOTE — PROGRESS NOTES
PT Re-evaluation and Dishcarge    Today's date: 2021  Patient name: Colonel Hoang  : 1980  MRN: [id number]  Referring provider: Renetta Patterson PT  Dx:   Encounter Diagnosis     ICD-10-CM    1  Chronic bilateral low back pain without sciatica  M54 5     G89 29        Start Time: 830  Stop Time: 925  Total time in clinic (min): 55 minutes    Assessment  Assessment details: Patient is a 45y o  year old male who attended physical therapy for 6 treatment sessions regarding CLBP, non radicular in nature  Patient reports 85% improvement at this time which correlates to improved impairments and functionality  Tasks that were bothersome, painful and that he avoided have improved as he reports less pain/no pain with bending to catch a baseball, bending forward into a drawer, running and various ADLs  Patient has shown improvement throughout PT by demonstrating decreased pain, increased range of motion, increased strength and improved tolerance to activity  Will DC PT at this time, patient performed well with PT, independent with HEP with confidence in ability to maintain gains made  Understanding of Dx/Px/POC: good   Prognosis: good    Plan  Frequency: 2x week  Plan of Care beginning date: 2021  Plan of Care expiration date: 2021  Treatment plan discussed with: patient        Subjective Evaluation    History of Present Illness  Mechanism of injury: Evaluation:  Blanca Harris is a 45 y o  male presenting to physical therapy on 08/10/21 with referral from [facility] for CLBP  Patient reports that his pain began about 2 years ago after he dove playing flag football and has been chronic since  In 9394-0400 dx with L3 HNP - no MRI  + pain with sneeze  No radicular symptoms reports or pain into groin, up LS into TS  Denies bowl bladder dysfunction  Pain is mainly felt with flexion based activity or prolonged positioning of his LS in awkward positions (sitting on couch slouched)   States that he will get a catch of pain when performing forward flexion  Limited at times with recreational activity such as golfing and running                Recurrent probem    Quality of life: good    Pain  Current pain ratin  At worst pain rating: 3  Location: lower LS -L2-4 region -central      Diagnostic Tests  No diagnostic tests performed  Treatments  No previous or current treatments  Patient Goals  Patient goals for therapy: decreased pain, increased strength and return to sport/leisure activities      Short Term Goals:   1  Patient will be Independent with hep - MET  2  Patient will improve pain with activity by 50% - MET  3  Patient will have pain free LS AROM - MET  4  Patient will improve PKB witohut LS compensation (to 90 deg) - MET      Long Term Goals:   1  Patient will improve FOTO to greater then goal - MET  2  Patient will improve pain with activity to 2/10 or less - IMPROVED  3  Patient will continue with HEP independence to allow for decreased future reoccurrence of pain and loss in function - MET  4  Patient will squat without pain or pull in LS/pelvis - IMPROVED  5  Patient will perform forward bending activity without catch or pain - IMPROVED        Objective    Flowsheet Rows      Most Recent Value   PT/OT G-Codes   Current Score  83   Projected Score  70         Posture: Flat lower LS, increased lordosis mid LS  Palpation: TTP R psoas - RESOLVED  Myotomes (L/R): normal LE  Dermatome: (pinprick- L/R):  Normal LE     Reflexes:  (L/R) L3-4: 1+ B/L       S1: 2+ B/L          Clonus= 1 beat    GAIT: lacking hip ext w/o LS ext - IMPROVED  Squat assess: 50%, early posterior pelvic til (wink) - IMPROVED 75% range      Lumbar Spine Protective Mechanism: normal      Lumbar  % of normal   Flex  100   Extn   75   SB Left 75   SB Right 75   ROT Left 100   ROT Right 90+   Hinge noted L3 with ext, SB** (RESOLVED)        MMT         AROM          PROM    Hip       L       R        L           R      L     R Flex          Extn  Abd  Add  IR      20 20   ER  40 50            G  Max 4 4       G  Med  4 4       Iliop               Redcord:  Supine lift: 0F B/L  Prone bridge: OD B/L  SL hip ABD: 0F B/L    Neuro Dynamic Testing:  Slump test: L=   neg  R=   neg            Hip:       90/90 Hamstring length=positive B/L  prone knee flexion=       positive B/L for LS compensation (R> L) -- IMPROVED         kalpana test= positive B/L - IMPROVED           Segmental mobility:   LS=  Hypermobile L3, Hypomobile T12-L2    (ALL MOBILITY IMPROVED)                 Precautions: standard      Manuals 8/10 9/8           assessment  10'           B/L hip LAD G5            R psoas STM 3'            Gapping TLJ R G4            Gapping L5 G4            Neuro Re-Ed 8/10            HHR 10 - TC            Standing hip ext slider with LS/core control 10x ea side            Prone knee flexion with LS/core control 10x ea side            Bridge 2x5            redcord SL hip ABD  Bunge x 1 - 3 rds ea side           redcord supine lift  UL rope  3 rds ea side           Prone bridge  bungex1 - UL rope -            Ther Ex             Self TS mob             Self TS rot @ wall / qped                                                                                           Ther Activity                                       Gait Training                                       Modalities

## 2021-11-13 ENCOUNTER — IMMUNIZATIONS (OUTPATIENT)
Dept: FAMILY MEDICINE CLINIC | Facility: HOSPITAL | Age: 41
End: 2021-11-13

## 2021-11-13 DIAGNOSIS — Z23 ENCOUNTER FOR IMMUNIZATION: Primary | ICD-10-CM

## 2021-11-13 PROCEDURE — 91300 COVID-19 PFIZER VACC 0.3 ML: CPT

## 2021-11-13 PROCEDURE — 0001A COVID-19 PFIZER VACC 0.3 ML: CPT

## 2022-01-06 ENCOUNTER — OFFICE VISIT (OUTPATIENT)
Dept: DERMATOLOGY | Facility: CLINIC | Age: 42
End: 2022-01-06
Payer: COMMERCIAL

## 2022-01-06 VITALS — BODY MASS INDEX: 32.98 KG/M2 | WEIGHT: 257 LBS | TEMPERATURE: 97.1 F | HEIGHT: 74 IN

## 2022-01-06 DIAGNOSIS — L82.1 SEBORRHEIC KERATOSIS: ICD-10-CM

## 2022-01-06 DIAGNOSIS — B07.0 PLANTAR WART OF RIGHT FOOT: ICD-10-CM

## 2022-01-06 DIAGNOSIS — D22.70 MULTIPLE BENIGN MELANOCYTIC NEVI OF UPPER AND LOWER EXTREMITIES AND TRUNK: Primary | ICD-10-CM

## 2022-01-06 DIAGNOSIS — D22.5 MULTIPLE BENIGN MELANOCYTIC NEVI OF UPPER AND LOWER EXTREMITIES AND TRUNK: Primary | ICD-10-CM

## 2022-01-06 DIAGNOSIS — D18.01 CHERRY ANGIOMA: ICD-10-CM

## 2022-01-06 DIAGNOSIS — D22.60 MULTIPLE BENIGN MELANOCYTIC NEVI OF UPPER AND LOWER EXTREMITIES AND TRUNK: Primary | ICD-10-CM

## 2022-01-06 PROCEDURE — 99203 OFFICE O/P NEW LOW 30 MIN: CPT | Performed by: STUDENT IN AN ORGANIZED HEALTH CARE EDUCATION/TRAINING PROGRAM

## 2022-01-06 PROCEDURE — 17110 DESTRUCTION B9 LES UP TO 14: CPT | Performed by: STUDENT IN AN ORGANIZED HEALTH CARE EDUCATION/TRAINING PROGRAM

## 2022-01-06 NOTE — PATIENT INSTRUCTIONS
MELANOCYTIC NEVI ("Moles")    Assessment and Plan:  Based on a thorough discussion of this condition and the management approach to it (including a comprehensive discussion of the known risks, side effects and potential benefits of treatment), the patient (family) agrees to implement the following specific plan:   Reassured benign     Melanocytic Nevi  Melanocytic nevi ("moles") are tan or brown, raised or flat areas of the skin which have an increased number of melanocytes  Melanocytes are the cells in our body which make pigment and account for skin color  Some moles are present at birth (I e , "congenital nevi"), while others come up later in life (i e , "acquired nevi")  The sun can stimulate the body to make more moles  Sunburns are not the only thing that triggers more moles  Chronic sun exposure can do it too  Clinically distinguishing a healthy mole from melanoma may be difficult, even for experienced dermatologists  The "ABCDE's" of moles have been suggested as a means of helping to alert a person to a suspicious mole and the possible increased risk of melanoma  The suggestions for raising alert are as follows:    Asymmetry: Healthy moles tend to be symmetric, while melanomas are often asymmetric  Asymmetry means if you draw a line through the mole, the two halves do not match in color, size, shape, or surface texture  Asymmetry can be a result of rapid enlargement of a mole, the development of a raised area on a previously flat lesion, scaling, ulceration, bleeding or scabbing within the mole  Any mole that starts to demonstrate "asymmetry" should be examined promptly by a board certified dermatologist      Border: Healthy moles tend to have discrete, even borders  The border of a melanoma often blends into the normal skin and does not sharply delineate the mole from normal skin    Any mole that starts to demonstrate "uneven borders" should be examined promptly by a board certified dermatologist      Color: Healthy moles tend to be one color throughout  Melanomas tend to be made up of different colors ranging from dark black, blue, white, or red  Any mole that demonstrates a color change should be examined promptly by a board certified dermatologist      Diameter: Healthy moles tend to be smaller than 0 6 cm in size; an exception are "congenital nevi" that can be larger  Melanomas tend to grow and can often be greater than 0 6 cm (1/4 of an inch, or the size of a pencil eraser)  This is only a guideline, and many normal moles may be larger than 0 6 cm without being unhealthy  Any mole that starts to change in size (small to bigger or bigger to smaller) should be examined promptly by a board certified dermatologist      Evolving: Healthy moles tend to "stay the same "  Melanomas may often show signs of change or evolution such as a change in size, shape, color, or elevation  Any mole that starts to itch, bleed, crust, burn, hurt, or ulcerate or demonstrate a change or evolution should be examined promptly by a board certified dermatologist       Dysplastic Nevi  Dysplastic moles are moles that fit the ABCDE rules of melanoma but are not identified as melanomas when examined under the microscope  They may indicate an increased risk of melanoma in that person  If there is a family history of melanoma, most experts agree that the person may be at an increased risk for developing a melanoma  Experts still do not agree on what dysplastic moles mean in patients without a personal or family history of melanoma  Dysplastic moles are usually larger than common moles and have different colors within it with irregular borders  The appearance can be very similar to a melanoma  Biopsies of dysplastic moles may show abnormalities which are different from a regular mole  Melanoma  Malignant melanoma is a type of skin cancer that can be deadly if it spreads throughout the body   The incidence of melanoma in the [place] is growing faster than any other cancer  Melanoma usually grows near the surface of the skin for a period of time, and then begins to grow deeper into the skin  Once it grows deeper into the skin, the risk of spread to other organs greatly increases  Therefore, early detection and removal of a malignant melanoma may result in a better chance at a complete cure; removal after the tumor has spread may not be as effective, leading to worse clinical outcomes such as death  The true rate of nevus transformation into a melanoma is unknown  It has been estimated that the lifetime risk for any acquired melanocytic nevus on any 21year-old individual transforming into melanoma by age [de-identified] is 0 03% (1 in 3,164) for men and 0 009% (1 in 10,800) for women  The appearance of a "new mole" remains one of the most reliable methods for identifying a malignant melanoma  Occasionally, melanomas appear as rapidly growing, blue-black, dome-shaped bumps within a previous mole or previous area of normal skin  Other times, melanomas are suspected when a mole suddenly appears or changes  Itching, burning, or pain in a pigmented lesion should increase suspicion, but most patients with early melanoma have no skin discomfort whatsoever  Melanoma can occur anywhere on the skin, including areas that are difficult for self-examination  Many melanomas are first noticed by other family members  Suspicious-looking moles may be removed for microscopic examination  You may be able to prevent death from melanoma by doing two simple things:    1  Try to avoid unnecessary sun exposure and protect your skin when it is exposed to the sun  People who live near the equator, people who have intermittent exposures to large amounts of sun, and people who have had sunburns in childhood or adolescence have an increased risk for melanoma  Sun sense and vigilant sun protection may be keys to helping to prevent melanoma  We recommend wearing UPF-rated sun protective clothing and sunglasses whenever possible and applying a moisturizer-sunscreen combination product (SPF 50+) such as Neutrogena Daily Defense to sun exposed areas of skin at least three times a day  2  Have your moles regularly examined by a board certified dermatologist AND by yourself or a family member/friend at home  We recommend that you have your moles examined at least once a year by a board certified dermatologist   Use your birthday as an annual reminder to have your "Birthday Suit" (I e , your skin) examined; it is a nice birthday gift to yourself to know that your skin is healthy appearing! Additionally, at-home self examinations may be helpful for detecting a possible melanoma  Use the ABCDEs we discussed and check your moles once a month at home  CHERRY ANGIOMAS    Assessment and Plan:  Based on a thorough discussion of this condition and the management approach to it (including a comprehensive discussion of the known risks, side effects and potential benefits of treatment), the patient (family) agrees to implement the following specific plan:   Reassured benign    Assessment and Plan:    Cherry angioma, also known as Neville Mustache spots, are benign vascular skin lesions  A "cherry angioma" is a firm red, blue or purple papule, 0 1-1 cm in diameter  When thrombosed, they can appear black in colour until evaluated with a dermatoscope when the red or purple colour is more easily seen  Cherry angioma may develop on any part of the body but most often appear on the scalp, face, lips and trunk  An angioma is due to proliferating endothelial cells; these are the cells that line the inside of a blood vessel  Angiomas can arise in early life or later in life; the most common type of angioma is a cherry angioma  Cherry angiomas are very common in males and females of any age or race   They are more noticeable in white skin than in skin of colour  They markedly increase in number from about the age of 36  There may be a family history of similar lesions  Eruptive cherry angiomas have been rarely reported to be associated with internal malignancy  The cause of angiomas is unknown  Genetic analysis of cherry angiomas has shown that they frequently carry specific somatic missense mutations in the GNAQ and GNA11 (Q209H) genes, which are involved in other vascular and melanocytic proliferations  Cherry angioma is usually diagnosed clinically and no investigations are necessary for the majority of lesions  It has a characteristic red-clod or lobular pattern on dermatoscopy (called lacunar pattern using conventional pattern analysis)  When there is uncertainty about the diagnosis, a biopsy may be performed  The angioma is composed of venules in a thickened papillary dermis  Collagen bundles may be prominent between the lobules  Cherry angiomas are harmless, so they do not usually have to be treated  Occasionally, they are removed to exclude a malignant skin lesion such as a nodular melanoma or because they are irritated or bleeding (and a subsequent risk for infection)  To decrease friction over the lesions, we recommend Neutrogena Daily Defense SPF 50+ at least 3 times a day  SEBORRHEIC KERATOSIS; NON-INFLAMED    Assessment and Plan:  Based on a thorough discussion of this condition and the management approach to it (including a comprehensive discussion of the known risks, side effects and potential benefits of treatment), the patient (family) agrees to implement the following specific plan:   Reassured benign    Seborrheic Keratosis  A seborrheic keratosis is a harmless warty spot that appears during adult life as a common sign of skin aging  Seborrheic keratoses can arise on any area of skin, covered or uncovered, with the usual exception of the palms and soles  They do not arise from mucous membranes   Seborrheic keratoses can have highly variable appearance  Seborrheic keratoses are extremely common  It has been estimated that over 90% of adults over the age of 61 years have one or more of them  They occur in males and females of all races, typically beginning to erupt in the 35s or 45s  They are uncommon under the age of 21 years  The precise cause of seborrhoeic keratoses is not known  Seborrhoeic keratoses are considered degenerative in nature  As time goes by, seborrheic keratoses tend to become more numerous  Some people inherit a tendency to develop a very large number of them; some people may have hundreds of them  The name "seborrheic keratosis" is misleading, because these lesions are not limited to a seborrhoeic distribution (scalp, mid-face, chest, upper back), nor are they formed from sebaceous glands, nor are they associated with sebum -- which is greasy  Seborrheic keratosis may also be called "SK," "Seb K," "basal cell papilloma," "senile wart," or "barnacle "      Researchers have noted:   Eruptive seborrhoeic keratoses can follow sunburn or dermatitis   Skin friction may be the reason they appear in body folds   Viral cause (e g , human papillomavirus) seems unlikely   Stable and clonal mutations or activation of FRFR3, PIK3CA, TRUMAN, AKT1 and EGFR genes are found in seborrhoeic keratoses   Seborrhoeic keratosis can arise from solar lentigo   FRFR3 mutations also arise in solar lentigines  These mutations are associated with increased age and location on the head and neck, suggesting a role of ultraviolet radiation in these lesions   Seborrheic keratoses do not harbour tumour suppressor gene mutations   Epidermal growth factor receptor inhibitors, which are used to treat some cancers, often result in an increase in verrucal (warty) keratoses  There is no easy way to remove multiple lesions on a single occasion    Unless a specific lesion is "inflamed" and is causing pain or stinging/burning or is bleeding, most insurance companies do not authorize treatment  PLANTAR WART (VERRUCAE)    Assessment and Plan:  Based on a thorough discussion of this condition and the management approach to it (including a comprehensive discussion of the known risks, side effects and potential benefits of treatment), the patient (family) agrees to implement the following specific plan:     Liquid nitrogen was applied for 10-12 seconds to the skin lesion and the expected blistering or scabbing reaction explained  Do not pick at the area  Patient reminded to expect hypopigmented scars from the procedure  Return if lesion fails to fully resolve  Plantar wart  Plantar warts (verrucas) are viral wats that include tender inwardly growing and painful 'myrmecia' on the sole, and clusters of less painful mosaic warts  Plantar epidermoid cysts are associated with warts  Persistent plantar warts may rarely be complicated by the development of verrucous carcinoma  What is the treatment for viral warts? Many people don't bother to treat viral warts because treatment can be more uncomfortable than warts --they are hardly ever a serious problem  Warts that are very small and not troublesome can be left alone, and in some cases, they will regress on its own  However, warts may be painful, and they often look ugly so cause embarrassment  To get rid of them, we have to stimulate the body's immune system to attack the wart virus  Persistence with the treatment and patience is essential!     Topical treatment: includes wart paints containing salicylic acid or similar compounds, which work by removing the dead surface skin cells  Podophyllin is a cytotoxic agent used in some products, and must not be used in pregnancy or women considering pregnancy  The paint is applied once daily  Treatment with wart paint usually makes the wart smaller and less uncomfortable; 70% of warts resolve within twelve weeks of daily applications    Soften the wart by soaking in a bath or bowl of hot soapy water  Rub the wart surface with a piece of pumice stone or emery board  Apply wart paint or gel accurately, allowing it to dry  Cover with plaster or duct tape  If the wart paint makes the skin sore, stop treatment until the discomfort has settled, then recommence as above  Take care to keep the chemical off normal skin   Cryotherapy: repeated at one to two-week intervals  It is uncomfortable and may result in blistering for several day or weeks  Success is in the order of 70% after 3-4 months of regular freezing  A hard freeze using liquid nitrogen might cause a permanent white alonzo or scar  It can also cause temporary numbness  An aerosol spray with a mixture of dimethyl ether and propane (DMEP) can be purchased over the counter to freeze common and plantar warts  Read and follow the instructions carefully  Combining Immunotherapy with cryotherapy reduces the number of cryotherapy sessions   Electrosurgery: is used for large and resistant warts  Under local anesthetic, the growth is pared away and the base burned  The wound heals in two weeks or longer; even then 20% of warts can be expected to recur within a few months  This treatment leaves a permanent scar   Other treatments: experimental treatments for recurrent, resistant or extensive warts include:  Topical retinoids, such as tretinoin cream or adapalene gel   The immune modulator, imiquimod cream   Fluorouracil cream   Bleomycin injections   Oral retinoids   Pulsed dye laser destruction of feeding blood vessels   Photodynamic therapy   Laser vaporization   H2 receptor antagonists   Oral zinc oxide and zinc sulfate   Applications of raw garlic or tea tree oil   Immune stimulation using diphencyprone, or squaric acid   Immunotherapy with Candida albicans or tuberculin PPD   Hypnosis   Hyperthermia   Occlusion with duct tape    How can viral warts be prevented?   HPV vaccines are available to prevent anogenital warts  Anecdotally, these have been reported to result in the clearance of non-genital warts in some people  What is the outlook for viral warts? No treatment is universally effective at eradicating viral warts  In children, even without treatment, 50% of warts disappear within six months, and 90% are gone in 2 years  They are more persistent in adults, but they clear up eventually  They are likely to recur in patients that are immune suppressed, eg, organ transplant recipients  Recurrence is more frequent in tobacco smokers

## 2022-01-06 NOTE — PROGRESS NOTES
[facility] Note     Patient Name: Kiara Love  Encounter Date: 01/06/22     Have you been cared for by a [facility] Dermatologist in the last 3 years and, if so, which one? No    · Have you traveled outside of the [address] [place] [address] in the past 3 months or outside of the [facility] area in the last 2 weeks? No     May we call your Preferred Phone number to discuss your specific medical information? Yes     May we leave a detailed message that includes your specific medical information? Yes      Today's Chief Concerns:   Concern #1:  Skin check      Past Medical History:  Have you personally ever had or currently have any of the following? · Skin cancer (such as Melanoma, Basal Cell Carcinoma, Squamous Cell Carcinoma? (If Yes, please provide more detail)- No  · Eczema: No  · Psoriasis: No  · HIV/AIDS: No  · Hepatitis B or C: No  · Tuberculosis: No  · Systemic Immunosuppression such as Diabetes, Biologic or Immunotherapy, Chemotherapy, Organ Transplantation, Bone Marrow Transplantation (If YES, please provide more detail): No  · Radiation Treatment (If YES, please provide more detail): No  · Any other major medical conditions/concerns? (If Yes, which types)- No    Social History:     What is/was your primary occupation? [employer]     What are your hobbies/past-times? Family History:  Have any of your "first degree relatives" (parent, brother, sister, or child) had any of the following       · Skin cancer such as Melanoma or Merkel Cell Carcinoma or Pancreatic Cancer? No  · Eczema, Asthma, Hay Fever or Seasonal Allergies: No  · Psoriasis or Psoriatic Arthritis: No  · Do any other medical conditions seem to run in your family? If Yes, what condition and which relatives? YES, paternal grandmother has hx of arthritis    Current Medications:     No current outpatient medications on file        Review of Systems:  Have you recently had or currently have any of the following? If YES, what are you doing for the problem? · Fever, chills or unintended weight loss: No  · Sudden loss or change in your vision: No  · Nausea, vomiting or blood in your stool: No  · Painful or swollen joints: No  · Wheezing or cough: No  · Changing mole or non-healing wound: No  · Nosebleeds: No  · Excessive sweating: No  · Easy or prolonged bleeding? No  · Over the last 2 weeks, how often have you been bothered by the following problems? · Taking little interest or pleasure in doing things: 1 - Not at All  · Feeling down, depressed, or hopeless: 1 - Not at All  · Rapid heartbeat with epinephrine:  No      · Any known allergies? · No Known Allergies      Physical Exam:     Was a chaperone (Derm Clinical Assistant) present throughout the entire Physical Exam? Yes     Did the Dermatology Team specifically  the patient on the importance of a Full Skin Exam to be sure that nothing is missed clinically?  Yes}  o Did the patient ultimately request or accept a Full Skin Exam?  Yes  o Did the patient specifically refuse to have the areas "under-the-bra" examined by the Dermatologist? No  o Did the patient specifically refuse to have the areas "under-the-underwear" examined by the Dermatologist? No    CONSTITUTIONAL:   Vitals:    01/06/22 0829   Temp: (!) 97 1 °F (36 2 °C)   TempSrc: Temporal   Weight: 117 kg (257 lb)   Height: 6' 2" (1 88 m)       PSYCH: Normal mood and affect  EYES: Normal conjunctiva  ENT: Normal lips and oral mucosa  CARDIOVASCULAR: No edema  RESPIRATORY: Normal respirations  HEME/LYMPH/IMMUNO:  No regional lymphadenopathy except as noted below in "ASSESSMENT AND PLAN BY DIAGNOSIS"    SKIN:  FULL ORGAN SYSTEM EXAM  Hair, Scalp, Ears, Face Normal except as noted below in Assessment   Neck, Cervical Chain Nodes Normal except as noted below in Assessment   Right Arm/Hand/Fingers Normal except as noted below in Assessment   Left Arm/Hand/Fingers Normal except as noted below in Assessment   Chest/Breasts/Axillae Viewed areas Normal except as noted below in Assessment   Abdomen, Umbilicus Normal except as noted below in Assessment   Back/Spine Normal except as noted below in Assessment   Buttocks Normal except as below (pt deferred genital exam)   Right Leg, Foot, Toes Normal except as noted below in Assessment   Left Leg, Foot, Toes Normal except as noted below in Assessment        Assessment and Plan by Diagnosis:    History of Present Condition:     Duration:  How long has this been an issue for you?    o  about a year   Location Affected:  Where on the body is this affecting you?    o  abdomen, chest, back   Quality:  Is there any bleeding, pain, itch, burning/irritation, or redness associated with the skin lesion? o  denies   Severity:  Describe any bleeding, pain, itch, burning/irritation, or redness on a scale of 1 to 10 (with 10 being the worst)  o  0   Timing:  Does this condition seem to be there pretty constantly or do you notice it more at specific times throughout the day? o  constant   Context:  Have you ever noticed that this condition seems to be associated with specific activities you do?    o  denies   Modifying Factors:    o Anything that seems to make the condition worse?    -  denies  o What have you tried to do to make the condition better?    -  denies   Associated Signs and Symptoms:  Does this skin lesion seem to be associated with any of the following:        MELANOCYTIC NEVI ("Moles")    Physical Exam:   Anatomic Location Affected:    Trunk and extremeties   Morphological Description:  Scattered, 1-4mm round to ovoid, symmetrical-appearing, even bordered, skin colored to dark brown macules/papules, mostly in sun-exposed areas   Pertinent Positives:   Pertinent Negatives:     Additional History of Present Condition:  Present for years    Assessment and Plan:  Based on a thorough discussion of this condition and the management approach to it (including a comprehensive discussion of the known risks, side effects and potential benefits of treatment), the patient (family) agrees to implement the following specific plan:   Monitor for changes     Melanocytic Nevi  Melanocytic nevi ("moles") are tan or brown, raised or flat areas of the skin which have an increased number of melanocytes  Melanocytes are the cells in our body which make pigment and account for skin color  Some moles are present at birth (I e , "congenital nevi"), while others come up later in life (i e , "acquired nevi")  The sun can stimulate the body to make more moles  Sunburns are not the only thing that triggers more moles  Chronic sun exposure can do it too  Clinically distinguishing a healthy mole from melanoma may be difficult, even for experienced dermatologists  The "ABCDE's" of moles have been suggested as a means of helping to alert a person to a suspicious mole and the possible increased risk of melanoma  The suggestions for raising alert are as follows:    Asymmetry: Healthy moles tend to be symmetric, while melanomas are often asymmetric  Asymmetry means if you draw a line through the mole, the two halves do not match in color, size, shape, or surface texture  Asymmetry can be a result of rapid enlargement of a mole, the development of a raised area on a previously flat lesion, scaling, ulceration, bleeding or scabbing within the mole  Any mole that starts to demonstrate "asymmetry" should be examined promptly by a board certified dermatologist      Border: Healthy moles tend to have discrete, even borders  The border of a melanoma often blends into the normal skin and does not sharply delineate the mole from normal skin  Any mole that starts to demonstrate "uneven borders" should be examined promptly by a board certified dermatologist      Color: Healthy moles tend to be one color throughout    Melanomas tend to be made up of different colors ranging from dark black, blue, white, or red  Any mole that demonstrates a color change should be examined promptly by a board certified dermatologist      Diameter: Healthy moles tend to be smaller than 0 6 cm in size; an exception are "congenital nevi" that can be larger  Melanomas tend to grow and can often be greater than 0 6 cm (1/4 of an inch, or the size of a pencil eraser)  This is only a guideline, and many normal moles may be larger than 0 6 cm without being unhealthy  Any mole that starts to change in size (small to bigger or bigger to smaller) should be examined promptly by a board certified dermatologist      Evolving: Healthy moles tend to "stay the same "  Melanomas may often show signs of change or evolution such as a change in size, shape, color, or elevation  Any mole that starts to itch, bleed, crust, burn, hurt, or ulcerate or demonstrate a change or evolution should be examined promptly by a board certified dermatologist       Dysplastic Nevi  Dysplastic moles are moles that fit the ABCDE rules of melanoma but are not identified as melanomas when examined under the microscope  They may indicate an increased risk of melanoma in that person  If there is a family history of melanoma, most experts agree that the person may be at an increased risk for developing a melanoma  Experts still do not agree on what dysplastic moles mean in patients without a personal or family history of melanoma  Dysplastic moles are usually larger than common moles and have different colors within it with irregular borders  The appearance can be very similar to a melanoma  Biopsies of dysplastic moles may show abnormalities which are different from a regular mole  Melanoma  Malignant melanoma is a type of skin cancer that can be deadly if it spreads throughout the body  The incidence of melanoma in the [place] is growing faster than any other cancer   Melanoma usually grows near the surface of the skin for a period of time, and then begins to grow deeper into the skin  Once it grows deeper into the skin, the risk of spread to other organs greatly increases  Therefore, early detection and removal of a malignant melanoma may result in a better chance at a complete cure; removal after the tumor has spread may not be as effective, leading to worse clinical outcomes such as death  The true rate of nevus transformation into a melanoma is unknown  It has been estimated that the lifetime risk for any acquired melanocytic nevus on any 21year-old individual transforming into melanoma by age [de-identified] is 0 03% (1 in 3,164) for men and 0 009% (1 in 10,800) for women  The appearance of a "new mole" remains one of the most reliable methods for identifying a malignant melanoma  Occasionally, melanomas appear as rapidly growing, blue-black, dome-shaped bumps within a previous mole or previous area of normal skin  Other times, melanomas are suspected when a mole suddenly appears or changes  Itching, burning, or pain in a pigmented lesion should increase suspicion, but most patients with early melanoma have no skin discomfort whatsoever  Melanoma can occur anywhere on the skin, including areas that are difficult for self-examination  Many melanomas are first noticed by other family members  Suspicious-looking moles may be removed for microscopic examination  You may be able to prevent death from melanoma by doing two simple things:    1  Try to avoid unnecessary sun exposure and protect your skin when it is exposed to the sun  People who live near the equator, people who have intermittent exposures to large amounts of sun, and people who have had sunburns in childhood or adolescence have an increased risk for melanoma  Sun sense and vigilant sun protection may be keys to helping to prevent melanoma    We recommend wearing UPF-rated sun protective clothing and sunglasses whenever possible and applying a moisturizer-sunscreen combination product (SPF 50+) such as Neutrogena Daily Defense to sun exposed areas of skin at least three times a day  2  Have your moles regularly examined by a board certified dermatologist AND by yourself or a family member/friend at home  We recommend that you have your moles examined at least once a year by a board certified dermatologist   Use your birthday as an annual reminder to have your "Birthday Suit" (I e , your skin) examined; it is a nice birthday gift to yourself to know that your skin is healthy appearing! Additionally, at-home self examinations may be helpful for detecting a possible melanoma  Use the ABCDEs we discussed and check your moles once a month at home  CHERRY ANGIOMAS    Physical Exam:   Anatomic Location Affected:  Trunk and extremeties   Morphological Description:  Scattered cherry red, 1-4 mm papules   Pertinent Positives:   Pertinent Negatives: Additional History of Present Condition:  Present for years    Assessment and Plan:  Based on a thorough discussion of this condition and the management approach to it (including a comprehensive discussion of the known risks, side effects and potential benefits of treatment), the patient (family) agrees to implement the following specific plan:   Reassured benign    Assessment and Plan:    Cherry angioma, also known as Kamila Sorrel spots, are benign vascular skin lesions  A "cherry angioma" is a firm red, blue or purple papule, 0 1-1 cm in diameter  When thrombosed, they can appear black in colour until evaluated with a dermatoscope when the red or purple colour is more easily seen  Cherry angioma may develop on any part of the body but most often appear on the scalp, face, lips and trunk  An angioma is due to proliferating endothelial cells; these are the cells that line the inside of a blood vessel  Angiomas can arise in early life or later in life; the most common type of angioma is a cherry angioma    Cherry angiomas are very common in males and females of any age or race  They are more noticeable in white skin than in skin of colour  They markedly increase in number from about the age of 36  There may be a family history of similar lesions  Eruptive cherry angiomas have been rarely reported to be associated with internal malignancy  The cause of angiomas is unknown  Genetic analysis of cherry angiomas has shown that they frequently carry specific somatic missense mutations in the GNAQ and GNA11 (Q209H) genes, which are involved in other vascular and melanocytic proliferations  Cherry angioma is usually diagnosed clinically and no investigations are necessary for the majority of lesions  It has a characteristic red-clod or lobular pattern on dermatoscopy (called lacunar pattern using conventional pattern analysis)  When there is uncertainty about the diagnosis, a biopsy may be performed  The angioma is composed of venules in a thickened papillary dermis  Collagen bundles may be prominent between the lobules  Cherry angiomas are harmless, so they do not usually have to be treated  Occasionally, they are removed to exclude a malignant skin lesion such as a nodular melanoma or because they are irritated or bleeding (and a subsequent risk for infection)  To decrease friction over the lesions, we recommend Neutrogena Daily Defense SPF 50+ at least 3 times a day  SEBORRHEIC KERATOSIS; NON-INFLAMED    Physical Exam:   Anatomic Location Affected:  Back, right abdomen   Morphological Description:  Flat and raised, waxy, smooth to warty textured, yellow to brownish-grey to dark brown to blackish, discrete, "stuck-on" appearing papules   Pertinent Positives:   Pertinent Negatives: Additional History of Present Condition:  Patient reports bumps on the skin  Denies itch, burn, pain, bleeding or ulceration  Present constantly; nothing seems to make it worse or better  No prior treatment        Assessment and Plan:  Based on a thorough discussion of this condition and the management approach to it (including a comprehensive discussion of the known risks, side effects and potential benefits of treatment), the patient (family) agrees to implement the following specific plan:   Reassured benign    Seborrheic Keratosis  A seborrheic keratosis is a harmless warty spot that appears during adult life as a common sign of skin aging  Seborrheic keratoses can arise on any area of skin, covered or uncovered, with the usual exception of the palms and soles  They do not arise from mucous membranes  Seborrheic keratoses can have highly variable appearance  Seborrheic keratoses are extremely common  It has been estimated that over 90% of adults over the age of 61 years have one or more of them  They occur in males and females of all races, typically beginning to erupt in the 35s or 45s  They are uncommon under the age of 21 years  The precise cause of seborrhoeic keratoses is not known  Seborrhoeic keratoses are considered degenerative in nature  As time goes by, seborrheic keratoses tend to become more numerous  Some people inherit a tendency to develop a very large number of them; some people may have hundreds of them  The name "seborrheic keratosis" is misleading, because these lesions are not limited to a seborrhoeic distribution (scalp, mid-face, chest, upper back), nor are they formed from sebaceous glands, nor are they associated with sebum -- which is greasy    Seborrheic keratosis may also be called "SK," "Seb K," "basal cell papilloma," "senile wart," or "barnacle "      Researchers have noted:   Eruptive seborrhoeic keratoses can follow sunburn or dermatitis   Skin friction may be the reason they appear in body folds   Viral cause (e g , human papillomavirus) seems unlikely   Stable and clonal mutations or activation of FRFR3, PIK3CA, TRUMAN, AKT1 and EGFR genes are found in seborrhoeic keratoses   Seborrhoeic keratosis can arise from solar lentigo   FRFR3 mutations also arise in solar lentigines  These mutations are associated with increased age and location on the head and neck, suggesting a role of ultraviolet radiation in these lesions   Seborrheic keratoses do not harbour tumour suppressor gene mutations   Epidermal growth factor receptor inhibitors, which are used to treat some cancers, often result in an increase in verrucal (warty) keratoses  There is no easy way to remove multiple lesions on a single occasion  Unless a specific lesion is "inflamed" and is causing pain or stinging/burning or is bleeding, most insurance companies do not authorize treatment  PLANTAR WART (VERRUCAE)    Physical Exam:   Anatomic Location Affected:  Right foot   Morphological Description:  Plantar wart   Pertinent Positives:   Pertinent Negatives: Additional History of Present Condition:  Patient noticed back in october    Assessment and Plan:  Based on a thorough discussion of this condition and the management approach to it (including a comprehensive discussion of the known risks, side effects and potential benefits of treatment), the patient (family) agrees to implement the following specific plan:     Liquid nitrogen was applied for 10-12 seconds to the skin lesion and the expected blistering or scabbing reaction explained  Do not pick at the area  Patient reminded to expect hypopigmented scars from the procedure  Return if lesion fails to fully resolve  Plantar wart  Plantar warts (verrucas) are viral wats that include tender inwardly growing and painful 'myrmecia' on the sole, and clusters of less painful mosaic warts  Plantar epidermoid cysts are associated with warts  Persistent plantar warts may rarely be complicated by the development of verrucous carcinoma  What is the treatment for viral warts?   Many people don't bother to treat viral warts because treatment can be more uncomfortable than warts --they are hardly ever a serious problem  Warts that are very small and not troublesome can be left alone, and in some cases, they will regress on its own  However, warts may be painful, and they often look ugly so cause embarrassment  To get rid of them, we have to stimulate the body's immune system to attack the wart virus  Persistence with the treatment and patience is essential!     Topical treatment: includes wart paints containing salicylic acid or similar compounds, which work by removing the dead surface skin cells  Podophyllin is a cytotoxic agent used in some products, and must not be used in pregnancy or women considering pregnancy  The paint is applied once daily  Treatment with wart paint usually makes the wart smaller and less uncomfortable; 70% of warts resolve within twelve weeks of daily applications  Soften the wart by soaking in a bath or bowl of hot soapy water  Rub the wart surface with a piece of pumice stone or emery board  Apply wart paint or gel accurately, allowing it to dry  Cover with plaster or duct tape  If the wart paint makes the skin sore, stop treatment until the discomfort has settled, then recommence as above  Take care to keep the chemical off normal skin   Cryotherapy: repeated at one to two-week intervals  It is uncomfortable and may result in blistering for several day or weeks  Success is in the order of 70% after 3-4 months of regular freezing  A hard freeze using liquid nitrogen might cause a permanent white alonzo or scar  It can also cause temporary numbness  An aerosol spray with a mixture of dimethyl ether and propane (DMEP) can be purchased over the counter to freeze common and plantar warts  Read and follow the instructions carefully  Combining Immunotherapy with cryotherapy reduces the number of cryotherapy sessions   Electrosurgery: is used for large and resistant warts  Under local anesthetic, the growth is pared away and the base burned   The wound heals in two weeks or longer; even then 20% of warts can be expected to recur within a few months  This treatment leaves a permanent scar   Other treatments: experimental treatments for recurrent, resistant or extensive warts include:  Topical retinoids, such as tretinoin cream or adapalene gel   The immune modulator, imiquimod cream   Fluorouracil cream   Bleomycin injections   Oral retinoids   Pulsed dye laser destruction of feeding blood vessels   Photodynamic therapy   Laser vaporization   H2 receptor antagonists   Oral zinc oxide and zinc sulfate   Applications of raw garlic or tea tree oil   Immune stimulation using diphencyprone, or squaric acid   Immunotherapy with Candida albicans or tuberculin PPD   Hypnosis   Hyperthermia   Occlusion with duct tape    How can viral warts be prevented? HPV vaccines are available to prevent anogenital warts  Anecdotally, these have been reported to result in the clearance of non-genital warts in some people  What is the outlook for viral warts? No treatment is universally effective at eradicating viral warts  In children, even without treatment, 50% of warts disappear within six months, and 90% are gone in 2 years  They are more persistent in adults, but they clear up eventually  They are likely to recur in patients that are immune suppressed, eg, organ transplant recipients  Recurrence is more frequent in tobacco smokers  PROCEDURE:  DESTRUCTION OF PRE-MALIGNANT LESIONS  After a thorough discussion of treatment options and risk/benefits/alternatives (including but not limited to local pain, scarring, dyspigmentation, blistering, and possible superinfection), verbal and written consent were obtained and the aforementioned lesions were treated on with cryotherapy using liquid nitrogen x 1 cycle for 5-10 seconds   TOTAL NUMBER of 1 pre-malignant lesions were treated today on the ANATOMIC LOCATION:  Right foot       The patient tolerated the procedure well, and after-care instructions were provided      Scribe Attestation    I,:  Mayo Putnam am acting as a scribe while in the presence of the attending physician :       I,:  Anastacia Thomas MD personally performed the services described in this documentation    as scribed in my presence :

## 2022-03-31 ENCOUNTER — OFFICE VISIT (OUTPATIENT)
Dept: FAMILY MEDICINE CLINIC | Facility: CLINIC | Age: 42
End: 2022-03-31
Payer: COMMERCIAL

## 2022-03-31 VITALS
DIASTOLIC BLOOD PRESSURE: 70 MMHG | SYSTOLIC BLOOD PRESSURE: 110 MMHG | HEART RATE: 62 BPM | OXYGEN SATURATION: 98 % | WEIGHT: 195 LBS | BODY MASS INDEX: 25.03 KG/M2 | RESPIRATION RATE: 16 BRPM | HEIGHT: 74 IN | TEMPERATURE: 98.2 F

## 2022-03-31 DIAGNOSIS — Z00.00 PHYSICAL EXAM: Primary | ICD-10-CM

## 2022-03-31 PROCEDURE — 99396 PREV VISIT EST AGE 40-64: CPT | Performed by: NURSE PRACTITIONER

## 2022-03-31 RX ORDER — FLUTICASONE PROPIONATE 50 MCG
1 SPRAY, SUSPENSION (ML) NASAL DAILY
COMMUNITY

## 2022-03-31 NOTE — PROGRESS NOTES
FAMILY PRACTICE HEALTH MAINTENANCE OFFICE VISIT  [facility] - [facility] PRACTICE    NAME: Jennifer Hector  AGE: 37 y o  SEX: male  : 1980     DATE: 3/31/2022    Assessment and Plan     1  Physical exam        · Patient Counseling:   · Nutrition: Stressed importance of a well balanced diet, moderation of sodium/saturated fat, caloric balance and sufficient intake of fiber  · Exercise: Stressed the importance of regular exercise with a goal of 150 minutes per week  · Dental Health: Discussed daily flossing and brushing and regular dental visits     · Immunizations reviewed: Up To Date Tdap due in   · Discussed benefits of:  Screening labs   BMI Counseling: Body mass index is 25 04 kg/m²  Discussed with patient's BMI with him  The BMI is above normal  Nutrition recommendations include 3-5 servings of fruits/vegetables daily  Exercise recommendations include moderate aerobic physical activity for 150 minutes/week  Follow up in 1 year for annual physical or sooner if needed  Chief Complaint     Chief Complaint   Patient presents with    Well Check       History of Present Illness     Jennifer Hector is a 37year old male presenting today for annual exam      Feeling well  Has no complaints today  Training for 1/2 marathon in April  Seasonal allergies controlled with flonase       Will be completing A1c and lipid panel for employer health program              Well Adult Physical   Patient here for a comprehensive physical exam       Diet and Physical Activity  Diet: well balanced diet  Exercise: frequently Training for Half marathon     Depression Screen  PHQ-2/9 Depression Screening    Little interest or pleasure in doing things: 0 - not at all  Feeling down, depressed, or hopeless: 0 - not at all  PHQ-2 Score: 0  PHQ-2 Interpretation: Negative depression screen          General Health  Hearing: Normal:  bilateral  Vision: no vision problems  Dental: regular dental visits      The following portions of the patient's history were reviewed and updated as appropriate: allergies, current medications, past family history, past medical history, past social history, past surgical history and problem list     Review of Systems     Review of Systems   Constitutional: Negative  HENT: Negative  Respiratory: Negative  Cardiovascular: Negative  Gastrointestinal: Negative  Genitourinary: Negative  Musculoskeletal: Negative  Skin: Negative  Allergic/Immunologic: Positive for environmental allergies  Neurological: Negative  Hematological: Negative  Psychiatric/Behavioral: Negative  Past Medical History     Past Medical History:   Diagnosis Date    COVID-19        Past Surgical History     Past Surgical History:   Procedure Laterality Date    SKIN BIOPSY      VASECTOMY         Social History     Social History     Socioeconomic History    Marital status: /Civil Union     Spouse name: None    Number of children: None    Years of education: None    Highest education level: None   Occupational History    None   Tobacco Use    Smoking status: Never Smoker    Smokeless tobacco: Never Used   Vaping Use    Vaping Use: Never used   Substance and Sexual Activity    Alcohol use:  Yes     Alcohol/week: 2 0 standard drinks     Types: 2 Cans of beer per week     Comment: Social     Drug use: Never    Sexual activity: Yes     Partners: Female     Birth control/protection: Male Sterilization   Other Topics Concern    None   Social History Narrative    Caffeine use    Uses safety equipment    Uses safety equipment - Seatbelts      Social Determinants of Health     Financial Resource Strain: Not on file   Food Insecurity: Not on file   Transportation Needs: Not on file   Physical Activity: Not on file   Stress: Not on file   Social Connections: Not on file   Intimate Partner Violence: Not on file   Housing Stability: Not on file       Family History     Family History   Problem Relation Age of Onset    No Known Problems Mother     No Known Problems Father     Hearing loss Maternal Grandfather     Arthritis Paternal Grandmother        Current Medications       Current Outpatient Medications:     fluticasone (FLONASE) 50 mcg/act nasal spray, 1 spray into each nostril daily, Disp: , Rfl:      Allergies     No Known Allergies    Objective     /70   Pulse 62   Temp 98 2 °F (36 8 °C) (Temporal)   Resp 16   Ht 6' 2" (1 88 m)   Wt 88 5 kg (195 lb)   SpO2 98%   BMI 25 04 kg/m²      Physical Exam  Vitals and nursing note reviewed  Constitutional:       General: He is not in acute distress  Appearance: Normal appearance  He is well-developed  He is not ill-appearing  HENT:      Head: Normocephalic and atraumatic  Right Ear: Tympanic membrane normal       Left Ear: Tympanic membrane normal       Nose: Nose normal       Mouth/Throat:      Mouth: Mucous membranes are moist       Pharynx: Oropharynx is clear  Eyes:      Conjunctiva/sclera: Conjunctivae normal       Pupils: Pupils are equal, round, and reactive to light  Neck:      Thyroid: No thyromegaly  Cardiovascular:      Rate and Rhythm: Normal rate and regular rhythm  Heart sounds: No murmur heard  Pulmonary:      Effort: Pulmonary effort is normal  No respiratory distress  Breath sounds: Normal breath sounds  No wheezing or rales  Abdominal:      Palpations: Abdomen is soft  Tenderness: There is no abdominal tenderness  Musculoskeletal:      Cervical back: Normal range of motion and neck supple  Right lower leg: No edema  Left lower leg: No edema  Lymphadenopathy:      Cervical: No cervical adenopathy  Skin:     General: Skin is warm and dry  Findings: No rash  Neurological:      Mental Status: He is alert     Psychiatric:         Mood and Affect: Mood normal            KWAME Iqbal  [facility]

## 2022-07-19 ENCOUNTER — APPOINTMENT (OUTPATIENT)
Dept: LAB | Facility: CLINIC | Age: 42
End: 2022-07-19

## 2022-07-19 ENCOUNTER — TRANSCRIBE ORDERS (OUTPATIENT)
Dept: LAB | Facility: CLINIC | Age: 42
End: 2022-07-19

## 2022-07-19 DIAGNOSIS — Z00.8 HEALTH EXAMINATION IN POPULATION SURVEYS: ICD-10-CM

## 2022-07-19 DIAGNOSIS — Z00.8 HEALTH EXAMINATION IN POPULATION SURVEYS: Primary | ICD-10-CM

## 2022-07-19 LAB
CHOLEST SERPL-MCNC: 143 MG/DL
EST. AVERAGE GLUCOSE BLD GHB EST-MCNC: 105 MG/DL
HBA1C MFR BLD: 5.3 %
HDLC SERPL-MCNC: 64 MG/DL
LDLC SERPL CALC-MCNC: 67 MG/DL (ref 0–100)
NONHDLC SERPL-MCNC: 79 MG/DL
TRIGL SERPL-MCNC: 60 MG/DL

## 2022-07-19 PROCEDURE — 36415 COLL VENOUS BLD VENIPUNCTURE: CPT

## 2022-07-19 PROCEDURE — 83036 HEMOGLOBIN GLYCOSYLATED A1C: CPT

## 2022-07-19 PROCEDURE — 80061 LIPID PANEL: CPT

## 2023-06-30 ENCOUNTER — OFFICE VISIT (OUTPATIENT)
Dept: FAMILY MEDICINE CLINIC | Facility: CLINIC | Age: 43
End: 2023-06-30
Payer: COMMERCIAL

## 2023-06-30 ENCOUNTER — APPOINTMENT (OUTPATIENT)
Dept: LAB | Facility: CLINIC | Age: 43
End: 2023-06-30

## 2023-06-30 ENCOUNTER — TRANSCRIBE ORDERS (OUTPATIENT)
Dept: LAB | Facility: CLINIC | Age: 43
End: 2023-06-30

## 2023-06-30 VITALS
DIASTOLIC BLOOD PRESSURE: 78 MMHG | WEIGHT: 195 LBS | RESPIRATION RATE: 16 BRPM | BODY MASS INDEX: 25.03 KG/M2 | HEIGHT: 74 IN | HEART RATE: 66 BPM | TEMPERATURE: 98 F | SYSTOLIC BLOOD PRESSURE: 130 MMHG | OXYGEN SATURATION: 98 %

## 2023-06-30 DIAGNOSIS — Z00.8 HEALTH EXAMINATION IN POPULATION SURVEY: Primary | ICD-10-CM

## 2023-06-30 DIAGNOSIS — Z00.8 HEALTH EXAMINATION IN POPULATION SURVEY: ICD-10-CM

## 2023-06-30 DIAGNOSIS — Z00.00 PHYSICAL EXAM, ANNUAL: Primary | ICD-10-CM

## 2023-06-30 DIAGNOSIS — Z23 ENCOUNTER FOR IMMUNIZATION: ICD-10-CM

## 2023-06-30 LAB
CHOLEST SERPL-MCNC: 148 MG/DL
EST. AVERAGE GLUCOSE BLD GHB EST-MCNC: 100 MG/DL
HBA1C MFR BLD: 5.1 %
HDLC SERPL-MCNC: 61 MG/DL
LDLC SERPL CALC-MCNC: 76 MG/DL (ref 0–100)
NONHDLC SERPL-MCNC: 87 MG/DL
TRIGL SERPL-MCNC: 56 MG/DL

## 2023-06-30 PROCEDURE — 83036 HEMOGLOBIN GLYCOSYLATED A1C: CPT

## 2023-06-30 PROCEDURE — 80061 LIPID PANEL: CPT

## 2023-06-30 PROCEDURE — 36415 COLL VENOUS BLD VENIPUNCTURE: CPT

## 2023-06-30 NOTE — PROGRESS NOTES
FAMILY PRACTICE HEALTH MAINTENANCE OFFICE VISIT  [facility] - [facility]    NAME: Alex Baker  AGE: 40 y o  SEX: male  : 1980     DATE: 2023    Assessment and Plan     1  Physical exam, annual    2  Encounter for immunization  -     TDAP VACCINE GREATER THAN OR EQUAL TO 8YO IM        Patient Counseling:   Nutrition: Stressed importance of a well balanced diet, moderation of sodium/saturated fat, caloric balance and sufficient intake of fiber  Exercise: Stressed the importance of regular exercise with a goal of 150 minutes per week  Dental Health: Discussed daily flossing and brushing and regular dental visits     Immunizations reviewed: Up To Date   Tdap administered today  Discussed benefits of:  Screening labs  Will complete lipid screening and A1c as per insurance purposes  Colonoscopy at 40  PSA at 51  BMI Counseling: Body mass index is 25 04 kg/m²  Discussed with patient's BMI with him  The BMI is above normal  Exercise recommendations include exercising 3-5 times per week  Follow up for annual physical on one year or sooner if needed  Chief Complaint     Chief Complaint   Patient presents with   • Physical Exam       History of Present Illness     Alex Baker is a 40year old male presenting today for annual exam      He is feeling well and has no complaints today           Well Adult Physical   Patient here for a comprehensive physical exam       Diet and Physical Activity  Diet: well balanced diet  Exercise: frequently      Depression Screen  PHQ-2/9 Depression Screening    Little interest or pleasure in doing things: 0 - not at all  Feeling down, depressed, or hopeless: 0 - not at all  PHQ-2 Score: 0  PHQ-2 Interpretation: Negative depression screen          General Health  Hearing: Normal:  bilateral  Vision: no vision problems  Dental: regular dental visits        The following portions of the patient's history were reviewed and updated as appropriate: allergies, current medications, past family history, past medical history, past social history, past surgical history and problem list     Review of Systems     Review of Systems   Constitutional: Negative  HENT: Negative  Respiratory: Negative  Cardiovascular: Negative  Gastrointestinal: Negative  Genitourinary: Negative  Musculoskeletal: Negative  Skin: Negative  Neurological: Negative  Hematological: Negative  Psychiatric/Behavioral: Negative  Past Medical History     Past Medical History:   Diagnosis Date   • Allergic 2006    Seasonal- use flonase   • COVID-19        Past Surgical History     Past Surgical History:   Procedure Laterality Date   • SKIN BIOPSY     • VASECTOMY         Social History     Social History     Socioeconomic History   • Marital status: /Civil Union     Spouse name: None   • Number of children: None   • Years of education: None   • Highest education level: None   Occupational History   • None   Tobacco Use   • Smoking status: Never   • Smokeless tobacco: Never   Vaping Use   • Vaping Use: Never used   Substance and Sexual Activity   • Alcohol use:  Yes     Alcohol/week: 1 0 - 2 0 standard drink of alcohol     Types: 1 - 2 Cans of beer per week     Comment: Social    • Drug use: Never   • Sexual activity: Yes     Partners: Female     Birth control/protection: Male Sterilization   Other Topics Concern   • None   Social History Narrative    Caffeine use    Uses safety equipment    Uses safety equipment - Seatbelts      Social Determinants of Health     Financial Resource Strain: Not on file   Food Insecurity: Not on file   Transportation Needs: Not on file   Physical Activity: Not on file   Stress: Not on file   Social Connections: Not on file   Intimate Partner Violence: Not on file   Housing Stability: Not on file       Family History     Family History   Problem Relation Age of Onset   • No Known Problems Mother    • "Thyroid disease Father    • Hearing loss Maternal Grandfather    • Hypertension Maternal Grandfather    • Hyperlipidemia Maternal Grandfather    • Heart disease Maternal Grandfather    • Arthritis Paternal Grandmother    • Hyperlipidemia Maternal Grandmother    • Prostate cancer Paternal Grandfather        Current Medications       Current Outpatient Medications:   •  fluticasone (FLONASE) 50 mcg/act nasal spray, 1 spray into each nostril daily, Disp: , Rfl:      Allergies     No Known Allergies    Objective     /78 (BP Location: Right arm, Patient Position: Sitting, Cuff Size: Standard)   Pulse 66   Temp 98 °F (36 7 °C) (Temporal)   Resp 16   Ht 6' 2\" (1 88 m)   Wt 88 5 kg (195 lb)   SpO2 98%   BMI 25 04 kg/m²      Physical Exam  Vitals and nursing note reviewed  Constitutional:       General: He is not in acute distress  Appearance: Normal appearance  He is well-developed  He is not ill-appearing  HENT:      Head: Normocephalic and atraumatic  Right Ear: Tympanic membrane normal       Left Ear: Tympanic membrane normal       Mouth/Throat:      Mouth: Mucous membranes are moist       Pharynx: Oropharynx is clear  Eyes:      Conjunctiva/sclera: Conjunctivae normal       Pupils: Pupils are equal, round, and reactive to light  Neck:      Thyroid: No thyromegaly  Cardiovascular:      Rate and Rhythm: Normal rate and regular rhythm  Heart sounds: No murmur heard  Pulmonary:      Effort: Pulmonary effort is normal       Breath sounds: Normal breath sounds  Abdominal:      General: Bowel sounds are normal       Palpations: Abdomen is soft  Tenderness: There is no abdominal tenderness  Musculoskeletal:         General: No deformity  Cervical back: Normal range of motion and neck supple  Right lower leg: No edema  Left lower leg: No edema  Lymphadenopathy:      Cervical: No cervical adenopathy  Skin:     Findings: No rash     Neurological:      Mental Status: " He is alert and oriented to person, place, and time     Psychiatric:         Mood and Affect: Mood normal                KWAME Rojas  [facility]

## 2024-06-28 DIAGNOSIS — Z00.6 ENCOUNTER FOR EXAMINATION FOR NORMAL COMPARISON OR CONTROL IN CLINICAL RESEARCH PROGRAM: ICD-10-CM

## 2024-07-19 ENCOUNTER — OFFICE VISIT (OUTPATIENT)
Dept: FAMILY MEDICINE CLINIC | Facility: CLINIC | Age: 44
End: 2024-07-19
Payer: COMMERCIAL

## 2024-07-19 VITALS
OXYGEN SATURATION: 99 % | BODY MASS INDEX: 24.49 KG/M2 | SYSTOLIC BLOOD PRESSURE: 126 MMHG | RESPIRATION RATE: 16 BRPM | HEART RATE: 84 BPM | DIASTOLIC BLOOD PRESSURE: 84 MMHG | HEIGHT: 74 IN | TEMPERATURE: 98 F | WEIGHT: 190.8 LBS

## 2024-07-19 DIAGNOSIS — Z13.1 DIABETES MELLITUS SCREENING: ICD-10-CM

## 2024-07-19 DIAGNOSIS — Z13.220 LIPID SCREENING: ICD-10-CM

## 2024-07-19 DIAGNOSIS — Z00.00 PHYSICAL EXAM, ANNUAL: Primary | ICD-10-CM

## 2024-07-19 PROCEDURE — 99396 PREV VISIT EST AGE 40-64: CPT | Performed by: NURSE PRACTITIONER

## 2024-07-19 NOTE — PROGRESS NOTES
FAMILY [facility] HEALTH MAINTENANCE OFFICE VISIT  [facility] - [facility] PRACTICE    NAME: Librado Moe  AGE: 43 y.o. SEX: male  : 1980     DATE: 2024    Assessment and Plan     1. Physical exam, annual  -     CBC and differential; Future  -     Comprehensive metabolic panel; Future  -     TSH, 3rd generation; Future  2. Diabetes mellitus screening  -     Hemoglobin A1C; Future  3. Lipid screening  -     Lipid panel; Future      Patient Counseling:   Nutrition: Stressed importance of a well balanced diet, moderation of sodium/saturated fat, caloric balance and sufficient intake of fiber  Exercise: Stressed the importance of regular exercise with a goal of 150 minutes per week  Dental Health: Discussed daily flossing and brushing and regular dental visits     Immunizations reviewed: Up To Date  Discussed benefits of:  Screening labs.      Follow up in one year or sooner if needed.         Chief Complaint     Chief Complaint   Patient presents with    Physical Exam       History of Present Illness     Librado Moe is a 43 year old male presenting today for annual exam.     He is feeling well and has no complaints today.           Well Adult Physical   Patient here for a comprehensive physical exam.      Diet and Physical Activity  Diet: well balanced diet  Exercise: frequently      Depression Screen  PHQ-2/9 Depression Screening    Little interest or pleasure in doing things: 0 - not at all  Feeling down, depressed, or hopeless: 0 - not at all  PHQ-2 Score: 0  PHQ-2 Interpretation: Negative depression screen          General Health  Hearing: Normal:  bilateral Hearing screen last year, was good.   Vision: goes for regular eye exams and wears contacts  Dental: regular dental visits          The following portions of the patient's history were reviewed and updated as appropriate: allergies, current medications, past family history, past medical history, past social  history, past surgical history and problem list.    Review of Systems     Review of Systems   Constitutional: Negative.    HENT: Negative.     Eyes:  Negative for visual disturbance.   Respiratory: Negative.     Cardiovascular: Negative.    Gastrointestinal: Negative.    Genitourinary: Negative.    Musculoskeletal: Negative.    Skin: Negative.    Neurological: Negative.    Hematological: Negative.    Psychiatric/Behavioral: Negative.         Past Medical History     Past Medical History:   Diagnosis Date    Allergic 2006    Seasonal- use flonase    COVID-19        Past Surgical History     Past Surgical History:   Procedure Laterality Date    SKIN BIOPSY      VASECTOMY         Social History     Social History     Socioeconomic History    Marital status: /Civil Union     Spouse name: None    Number of children: None    Years of education: None    Highest education level: None   Occupational History    None   Tobacco Use    Smoking status: Never    Smokeless tobacco: Never   Vaping Use    Vaping status: Never Used   Substance and Sexual Activity    Alcohol use: Yes     Alcohol/week: 1.0 standard drink of alcohol     Types: 1 Cans of beer per week     Comment: Social     Drug use: Never    Sexual activity: Yes     Partners: Female     Birth control/protection: Male Sterilization   Other Topics Concern    None   Social History Narrative    Caffeine use    Uses safety equipment    Uses safety equipment - Seatbelts      Social Determinants of Health     Financial Resource Strain: Not on file   Food Insecurity: Not on file   Transportation Needs: Not on file   Physical Activity: Not on file   Stress: Not on file   Social Connections: Not on file   Intimate Partner Violence: Not on file   Housing Stability: Not on file       Family History     Family History   Problem Relation Age of Onset    No Known Problems Mother     Thyroid disease Father     Hearing loss Maternal Grandfather     Hypertension Maternal  "Grandfather     Hyperlipidemia Maternal Grandfather     Heart disease Maternal Grandfather     Arthritis Paternal Grandmother     Dementia Paternal Grandmother     Hyperlipidemia Maternal Grandmother     Hearing loss Maternal Grandmother     Prostate cancer Paternal Grandfather     Prostate cancer Paternal Uncle        Current Medications       Current Outpatient Medications:     fluticasone (FLONASE) 50 mcg/act nasal spray, 1 spray into each nostril daily, Disp: , Rfl:      Allergies     No Known Allergies    Objective     /84 (BP Location: Left arm, Patient Position: Sitting, Cuff Size: Standard)   Pulse 84   Temp 98 °F (36.7 °C) (Temporal)   Resp 16   Ht 6' 2\" (1.88 m)   Wt 86.5 kg (190 lb 12.8 oz)   SpO2 99%   BMI 24.50 kg/m²      Physical Exam  Vitals and nursing note reviewed.   Constitutional:       General: He is not in acute distress.     Appearance: Normal appearance. He is well-developed. He is not ill-appearing.   HENT:      Head: Normocephalic and atraumatic.      Right Ear: Tympanic membrane normal.      Left Ear: Tympanic membrane normal.      Mouth/Throat:      Mouth: Oropharynx is clear and moist. Mucous membranes are moist.      Pharynx: Oropharynx is clear.   Eyes:      Extraocular Movements: EOM normal.      Conjunctiva/sclera: Conjunctivae normal.      Pupils: Pupils are equal, round, and reactive to light.   Neck:      Thyroid: No thyromegaly.   Cardiovascular:      Rate and Rhythm: Normal rate and regular rhythm.      Heart sounds: No murmur heard.  Pulmonary:      Effort: Pulmonary effort is normal.      Breath sounds: Normal breath sounds.   Musculoskeletal:         General: No deformity.      Cervical back: Normal range of motion and neck supple.      Right lower leg: No edema.      Left lower leg: No edema.   Lymphadenopathy:      Cervical: No cervical adenopathy.   Skin:     Findings: No rash.   Neurological:      Mental Status: He is alert and oriented to person, place, and " time.   Psychiatric:         Mood and Affect: Mood and affect and mood normal.                 KWAME Oglesby  [facility]

## 2024-07-24 ENCOUNTER — APPOINTMENT (OUTPATIENT)
Dept: LAB | Facility: CLINIC | Age: 44
End: 2024-07-24
Payer: COMMERCIAL

## 2024-07-24 DIAGNOSIS — Z13.220 LIPID SCREENING: ICD-10-CM

## 2024-07-24 DIAGNOSIS — Z13.1 DIABETES MELLITUS SCREENING: ICD-10-CM

## 2024-07-24 DIAGNOSIS — Z00.6 ENCOUNTER FOR EXAMINATION FOR NORMAL COMPARISON OR CONTROL IN CLINICAL RESEARCH PROGRAM: ICD-10-CM

## 2024-07-24 DIAGNOSIS — Z00.00 PHYSICAL EXAM, ANNUAL: ICD-10-CM

## 2024-07-24 LAB
ALBUMIN SERPL BCG-MCNC: 4.3 G/DL (ref 3.5–5)
ALP SERPL-CCNC: 73 U/L (ref 34–104)
ALT SERPL W P-5'-P-CCNC: 13 U/L (ref 7–52)
ANION GAP SERPL CALCULATED.3IONS-SCNC: 9 MMOL/L (ref 4–13)
AST SERPL W P-5'-P-CCNC: 14 U/L (ref 13–39)
BASOPHILS # BLD AUTO: 0.04 THOUSANDS/ÂΜL (ref 0–0.1)
BASOPHILS NFR BLD AUTO: 1 % (ref 0–1)
BILIRUB SERPL-MCNC: 0.7 MG/DL (ref 0.2–1)
BUN SERPL-MCNC: 15 MG/DL (ref 5–25)
CALCIUM SERPL-MCNC: 9.6 MG/DL (ref 8.4–10.2)
CHLORIDE SERPL-SCNC: 102 MMOL/L (ref 96–108)
CHOLEST SERPL-MCNC: 131 MG/DL
CO2 SERPL-SCNC: 28 MMOL/L (ref 21–32)
CREAT SERPL-MCNC: 0.91 MG/DL (ref 0.6–1.3)
EOSINOPHIL # BLD AUTO: 0.11 THOUSAND/ÂΜL (ref 0–0.61)
EOSINOPHIL NFR BLD AUTO: 2 % (ref 0–6)
ERYTHROCYTE [DISTWIDTH] IN BLOOD BY AUTOMATED COUNT: 12.7 % (ref 11.6–15.1)
EST. AVERAGE GLUCOSE BLD GHB EST-MCNC: 103 MG/DL
GFR SERPL CREATININE-BSD FRML MDRD: 103 ML/MIN/1.73SQ M
GLUCOSE P FAST SERPL-MCNC: 84 MG/DL (ref 65–99)
HBA1C MFR BLD: 5.2 %
HCT VFR BLD AUTO: 46.8 % (ref 36.5–49.3)
HDLC SERPL-MCNC: 53 MG/DL
HGB BLD-MCNC: 15 G/DL (ref 12–17)
IMM GRANULOCYTES # BLD AUTO: 0.01 THOUSAND/UL (ref 0–0.2)
IMM GRANULOCYTES NFR BLD AUTO: 0 % (ref 0–2)
LDLC SERPL CALC-MCNC: 68 MG/DL (ref 0–100)
LYMPHOCYTES # BLD AUTO: 1.71 THOUSANDS/ÂΜL (ref 0.6–4.47)
LYMPHOCYTES NFR BLD AUTO: 33 % (ref 14–44)
MCH RBC QN AUTO: 30 PG (ref 26.8–34.3)
MCHC RBC AUTO-ENTMCNC: 32.1 G/DL (ref 31.4–37.4)
MCV RBC AUTO: 94 FL (ref 82–98)
MONOCYTES # BLD AUTO: 0.47 THOUSAND/ÂΜL (ref 0.17–1.22)
MONOCYTES NFR BLD AUTO: 9 % (ref 4–12)
NEUTROPHILS # BLD AUTO: 2.89 THOUSANDS/ÂΜL (ref 1.85–7.62)
NEUTS SEG NFR BLD AUTO: 55 % (ref 43–75)
NONHDLC SERPL-MCNC: 78 MG/DL
NRBC BLD AUTO-RTO: 0 /100 WBCS
PLATELET # BLD AUTO: 211 THOUSANDS/UL (ref 149–390)
PMV BLD AUTO: 12 FL (ref 8.9–12.7)
POTASSIUM SERPL-SCNC: 4.6 MMOL/L (ref 3.5–5.3)
PROT SERPL-MCNC: 7 G/DL (ref 6.4–8.4)
RBC # BLD AUTO: 5 MILLION/UL (ref 3.88–5.62)
SODIUM SERPL-SCNC: 139 MMOL/L (ref 135–147)
TRIGL SERPL-MCNC: 51 MG/DL
TSH SERPL DL<=0.05 MIU/L-ACNC: 2.68 UIU/ML (ref 0.45–4.5)
WBC # BLD AUTO: 5.23 THOUSAND/UL (ref 4.31–10.16)

## 2024-07-24 PROCEDURE — 84443 ASSAY THYROID STIM HORMONE: CPT

## 2024-07-24 PROCEDURE — 80053 COMPREHEN METABOLIC PANEL: CPT

## 2024-07-24 PROCEDURE — 80061 LIPID PANEL: CPT

## 2024-07-24 PROCEDURE — 36415 COLL VENOUS BLD VENIPUNCTURE: CPT

## 2024-07-24 PROCEDURE — 83036 HEMOGLOBIN GLYCOSYLATED A1C: CPT

## 2024-07-24 PROCEDURE — 85025 COMPLETE CBC W/AUTO DIFF WBC: CPT

## 2024-08-06 LAB
APOB+LDLR+PCSK9 GENE MUT ANL BLD/T: NOT DETECTED
BRCA1+BRCA2 DEL+DUP + FULL MUT ANL BLD/T: NOT DETECTED
MLH1+MSH2+MSH6+PMS2 GN DEL+DUP+FUL M: NOT DETECTED

## 2024-08-29 ENCOUNTER — OFFICE VISIT (OUTPATIENT)
Dept: PHYSICAL THERAPY | Facility: OTHER | Age: 44
End: 2024-08-29
Payer: COMMERCIAL

## 2024-08-29 DIAGNOSIS — M21.41 PES PLANUS OF BOTH FEET: Primary | ICD-10-CM

## 2024-08-29 DIAGNOSIS — M21.42 PES PLANUS OF BOTH FEET: Primary | ICD-10-CM

## 2024-08-29 PROCEDURE — 97161 PT EVAL LOW COMPLEX 20 MIN: CPT | Performed by: PHYSICAL THERAPIST

## 2024-08-29 PROCEDURE — 97760 ORTHOTIC MGMT&TRAING 1ST ENC: CPT | Performed by: PHYSICAL THERAPIST

## 2024-08-29 NOTE — PROGRESS NOTES
Orthotic Evaluation    Today's date: 24  Patient name: Librado Moe  : 1980  MRN: [id number]  Referring provider: Wendi Joshi PT  Dx:   Encounter Diagnosis     ICD-10-CM    1. Pes planus of both feet  M21.41     M21.42           Start Time: 0845  Stop Time: 0905  Total time in clinic (min): 20 minutes     Subjective:    Giovanny presents today for orthotic evaluation. he complains of ambulatory dysfunction and postural dysfunction with functional activities including ambulation, leisure, and work. He has utilized custom orthotics previously. Reports his current pair are well worn and he feels he would benefit from a new design. The patient plans to use his orthotic for walking, standing, and work. The orthotic will be placed in a standard, size 12 [employer] and [employer] shoe.    Objective:    Age: 43 y.o.  Weight: 188    Foot Posture Index Score    Right Foot  Talar dome - +1  Malleolar curve - +1   Calcaneal eversion - +1  Talonavicular bulge - +1  Medial longitudinal arch - +1  Too many toes - +1  Total Score R = 6    Left Foot  Talar dome - +1  Malleolar curve - 0   Calcaneal eversion - +1  Talonavicular bulge - +1  Medial longitudinal arch - +1  Too many toes - 0  Total Score L = 4    Reference Values  Normal =    0 to +5  Pronated =  +6 to +9 Highly Pronated =  10+  Supinated =   -1 to -4 Highly Supinated = -5 to -12    Objective     General Comments:      Ankle/Foot Comments   Able to resupinate with heel raise  Appropriate amount of pronation with mini squat  R hip sits higher, slightly more pronated on R  Walking gait- excessive midstance pronation, does not resupinate, increased pronation in comparison to last orthotic evaluation      Assessment:    Patient requires custom foot orthosis with deepened heel cup street style cut, neutral post to correct altered gait mechanics in his work shoe. Patient is not currently controlled by his current shoe wear. Patient was educated on the design of  the custom orthotic and it's ability to offload his current pathology. Patient was also educated on potential shoe wear changes with device, break-in period, and skin checks to avoid potential skin break down.     Orthotic goals:  1) Patient will have custom foot orthoses fitted to his shoe.   2) Patient will be compliant with break-in period of custom foot orthoses as prescribed by PT.   3) Patient will be compliant with custom foot orthoses use as prescribed by PT.     Plan:    Planned therapy interventions: orthotic fitting/training  Duration in visits: 2

## 2024-10-09 ENCOUNTER — OFFICE VISIT (OUTPATIENT)
Dept: PHYSICAL THERAPY | Facility: OTHER | Age: 44
End: 2024-10-09
Payer: COMMERCIAL

## 2024-10-09 DIAGNOSIS — M21.42 PES PLANUS OF BOTH FEET: Primary | ICD-10-CM

## 2024-10-09 DIAGNOSIS — M21.41 PES PLANUS OF BOTH FEET: Primary | ICD-10-CM

## 2024-10-09 PROCEDURE — L3010 FOOT LONGITUDINAL ARCH SUPPO: HCPCS | Performed by: PHYSICAL THERAPIST

## 2024-10-09 NOTE — PROGRESS NOTES
Custom orthotics fitted to patients shoe and dispensed. Patient initially noting comfort with new device donned. Patient educated on appropriate break in period. Patient will follow up if any future problems arise.

## 2024-10-25 ENCOUNTER — OFFICE VISIT (OUTPATIENT)
Dept: FAMILY MEDICINE CLINIC | Facility: CLINIC | Age: 44
End: 2024-10-25
Payer: COMMERCIAL

## 2024-10-25 VITALS
BODY MASS INDEX: 24.41 KG/M2 | SYSTOLIC BLOOD PRESSURE: 126 MMHG | HEART RATE: 54 BPM | RESPIRATION RATE: 16 BRPM | DIASTOLIC BLOOD PRESSURE: 74 MMHG | HEIGHT: 74 IN | OXYGEN SATURATION: 98 % | WEIGHT: 190.2 LBS

## 2024-10-25 DIAGNOSIS — Z23 ENCOUNTER FOR IMMUNIZATION: ICD-10-CM

## 2024-10-25 DIAGNOSIS — R05.8 POST-VIRAL COUGH SYNDROME: Primary | ICD-10-CM

## 2024-10-25 PROCEDURE — 90656 IIV3 VACC NO PRSV 0.5 ML IM: CPT

## 2024-10-25 PROCEDURE — 99213 OFFICE O/P EST LOW 20 MIN: CPT | Performed by: FAMILY MEDICINE

## 2024-10-25 PROCEDURE — 90471 IMMUNIZATION ADMIN: CPT

## 2024-10-25 RX ORDER — BENZONATATE 100 MG/1
100 CAPSULE ORAL 3 TIMES DAILY PRN
Qty: 20 CAPSULE | Refills: 0 | Status: SHIPPED | OUTPATIENT
Start: 2024-10-25

## 2024-10-25 NOTE — PROGRESS NOTES
"Ambulatory Visit  Name: Librado Moe      : 1980      MRN: [id number]  Encounter Provider: Dulce Maria Priest DO  Encounter Date: 10/25/2024   Encounter department: [facility]    Assessment & Plan  Post-viral cough syndrome  Intermittent dry cough since URI a few weeks ago. Discussed natural course of postviral cough. Can use Tessalon prn. Follow up if new concerns.  Orders:    benzonatate (TESSALON PERLES) 100 mg capsule; Take 1 capsule (100 mg total) by mouth 3 (three) times a day as needed for cough       History of Present Illness     HPI  Dry persistent cough for 4-5 weeks, started as wet cough in [place] at onset. R and L intercostal pain with cough. Generally does not have seasonal allergies in the fall, only spring. Feels well otherwise. No medications.       Review of Systems        Objective     /74   Pulse (!) 54   Resp 16   Ht 6' 2\" (1.88 m)   Wt 86.3 kg (190 lb 3.2 oz)   SpO2 98%   BMI 24.42 kg/m²     Physical Exam  Vitals reviewed.   Constitutional:       Appearance: Normal appearance. He is not ill-appearing.   HENT:      Right Ear: External ear normal.      Left Ear: External ear normal.      Nose: Nose normal.      Mouth/Throat:      Mouth: Mucous membranes are moist.      Pharynx: Oropharynx is clear. No oropharyngeal exudate or posterior oropharyngeal erythema.   Eyes:      Extraocular Movements: Extraocular movements intact.      Conjunctiva/sclera: Conjunctivae normal.   Cardiovascular:      Rate and Rhythm: Normal rate and regular rhythm.      Heart sounds: Normal heart sounds.   Pulmonary:      Effort: Pulmonary effort is normal.      Breath sounds: Normal breath sounds.   Abdominal:      General: Abdomen is flat.   Skin:     General: Skin is warm and dry.   Neurological:      Mental Status: He is alert.   Psychiatric:         Mood and Affect: Mood normal.         Behavior: Behavior normal.         "

## 2025-07-23 ENCOUNTER — OFFICE VISIT (OUTPATIENT)
Dept: FAMILY MEDICINE CLINIC | Facility: CLINIC | Age: 45
End: 2025-07-23
Payer: COMMERCIAL

## 2025-07-23 VITALS
DIASTOLIC BLOOD PRESSURE: 70 MMHG | RESPIRATION RATE: 22 BRPM | BODY MASS INDEX: 23.51 KG/M2 | SYSTOLIC BLOOD PRESSURE: 122 MMHG | TEMPERATURE: 98.5 F | WEIGHT: 183.2 LBS | OXYGEN SATURATION: 99 % | HEART RATE: 73 BPM | HEIGHT: 74 IN

## 2025-07-23 DIAGNOSIS — K62.5 RECTAL BLEEDING: ICD-10-CM

## 2025-07-23 DIAGNOSIS — Z00.00 ANNUAL PHYSICAL EXAM: Primary | ICD-10-CM

## 2025-07-23 PROCEDURE — 99396 PREV VISIT EST AGE 40-64: CPT | Performed by: FAMILY MEDICINE

## 2025-07-23 NOTE — PROGRESS NOTES
FAMILY PRACTICE OFFICE VISIT       NAME: Librado Moe  AGE: 44 y.o. SEX: male       : 1980        MRN: [id number]    DATE: 2025  TIME: 9:28 AM    Assessment and Plan     Problem List Items Addressed This Visit       Annual physical exam - Primary    Physical exam.  Overall the patient appears to be stable health.  He will obtain blood work as ordered for further evaluation.  Patient was given recommendation to see [facility] colorectal department for his history of some occasional rectal bleeding         Relevant Orders    Lipid panel    Comprehensive metabolic panel     Other Visit Diagnoses         Rectal bleeding        Relevant Orders    Ambulatory Referral to Colorectal Surgery                Chief Complaint     Chief Complaint   Patient presents with    Annual Exam     Physical         History of Present Illness     Patient in the office for annual wellness exam.  He denies any recent illness.  Patient exercises almost on daily basis.  He is an  for [facility] physical therapy group.  He is a non-smoker.  He stays active with his family and 2 teenage sons.  Patient has noticed occasional bright red blood suspected to be from hemorrhoidal tissue.  He states he has lost some weight this past year due to better diet and exercise.  He has no significant family history of colon cancer.        Review of Systems   Review of Systems   Constitutional: Negative.    HENT: Negative.     Eyes: Negative.    Respiratory: Negative.     Cardiovascular: Negative.    Gastrointestinal:  Positive for anal bleeding.   Genitourinary: Negative.    Musculoskeletal: Negative.    Skin: Negative.    Neurological: Negative.    Psychiatric/Behavioral: Negative.         Active Problem List   Problem List[1]    Past Medical History:  Past Medical History[2]    Past Surgical History:  Past Surgical History[3]    Family History:  Family History[4]    Social History:  Social History     Socioeconomic History     Marital status: /Civil Union     Spouse name: Not on file    Number of children: Not on file    Years of education: Not on file    Highest education level: Not on file   Occupational History    Not on file   Tobacco Use    Smoking status: Never    Smokeless tobacco: Never   Vaping Use    Vaping status: Never Used   Substance and Sexual Activity    Alcohol use: Yes     Alcohol/week: 1.0 standard drink of alcohol     Types: 1 Cans of beer per week     Comment: Social     Drug use: Never    Sexual activity: Yes     Partners: Female     Birth control/protection: Male Sterilization   Other Topics Concern    Not on file   Social History Narrative    Caffeine use    Uses safety equipment    Uses safety equipment - Seatbelts      Social Drivers of Health     Financial Resource Strain: Not on file   Food Insecurity: No Food Insecurity (7/22/2025)    Nursing - Inadequate Food Risk Classification     Worried About Running Out of Food in the Last Year: Never true     Ran Out of Food in the Last Year: Never true     Ran Out of Food in the Last Year: Not on file   Transportation Needs: No Transportation Needs (7/22/2025)    PRAPARE - Transportation     Lack of Transportation (Medical): No     Lack of Transportation (Non-Medical): No   Physical Activity: Not on file   Stress: Not on file   Social Connections: Not on file   Intimate Partner Violence: Not on file   Housing Stability: Low Risk  (7/22/2025)    Housing Stability Vital Sign     Unable to Pay for Housing in the Last Year: No     Number of Times Moved in the Last Year: 0     Homeless in the Last Year: No       Objective     Vitals:    07/23/25 0749   BP: 122/70   Pulse: 73   Resp: 22   Temp: 98.5 °F (36.9 °C)   SpO2: 99%     Wt Readings from Last 3 Encounters:   07/23/25 83.1 kg (183 lb 3.2 oz)   10/25/24 86.3 kg (190 lb 3.2 oz)   07/19/24 86.5 kg (190 lb 12.8 oz)       Physical Exam  Constitutional:       General: He is not in acute distress.     Appearance:  Normal appearance. He is not ill-appearing.   HENT:      Head: Normocephalic and atraumatic.      Right Ear: Tympanic membrane, ear canal and external ear normal. There is no impacted cerumen.      Left Ear: Tympanic membrane, ear canal and external ear normal. There is no impacted cerumen.     Eyes:      General:         Right eye: No discharge.         Left eye: No discharge.      Extraocular Movements: Extraocular movements intact.      Conjunctiva/sclera: Conjunctivae normal.      Pupils: Pupils are equal, round, and reactive to light.     Neck:      Vascular: No carotid bruit.     Cardiovascular:      Rate and Rhythm: Normal rate and regular rhythm.      Heart sounds: Normal heart sounds. No murmur heard.  Pulmonary:      Effort: Pulmonary effort is normal.      Breath sounds: Normal breath sounds. No wheezing, rhonchi or rales.   Abdominal:      General: Abdomen is flat. Bowel sounds are normal. There is no distension.      Palpations: Abdomen is soft.      Tenderness: There is no abdominal tenderness. There is no guarding or rebound.     Musculoskeletal:      Right lower leg: No edema.      Left lower leg: No edema.   Lymphadenopathy:      Cervical: No cervical adenopathy.     Skin:     Findings: No rash.     Neurological:      General: No focal deficit present.      Mental Status: He is alert and oriented to person, place, and time.      Cranial Nerves: No cranial nerve deficit.     Psychiatric:         Mood and Affect: Mood normal.         Behavior: Behavior normal.         Thought Content: Thought content normal.         Judgment: Judgment normal.         Pertinent Laboratory/Diagnostic Studies:  Lab Results   Component Value Date    GLUCOSE 89 01/29/2014    BUN 15 07/24/2024    CREATININE 0.91 07/24/2024    CALCIUM 9.6 07/24/2024     01/29/2014    K 4.6 07/24/2024    CO2 28 07/24/2024     07/24/2024     Lab Results   Component Value Date    ALT 13 07/24/2024    AST 14 07/24/2024    ALKPHOS 73  "07/24/2024    BILITOT 0.72 01/29/2014       Lab Results   Component Value Date    WBC 5.23 07/24/2024    HGB 15.0 07/24/2024    HCT 46.8 07/24/2024    MCV 94 07/24/2024     07/24/2024       No results found for: \"TSH\"    Lab Results   Component Value Date    CHOL 114 06/10/2015     Lab Results   Component Value Date    TRIG 51 07/24/2024     Lab Results   Component Value Date    HDL 53 07/24/2024     Lab Results   Component Value Date    LDLCALC 68 07/24/2024     Lab Results   Component Value Date    HGBA1C 5.2 07/24/2024       Results for orders placed or performed in visit on 07/24/24   [facility] Molecular Screen    Specimen: Arm, Left; Blood   Result Value Ref Range    LUGO SYNDROME DNA ANALYSIS Not Detected     HEREDITARY BREAST & OVARIAN CANCER DNA ANALYSIS Not Detected     FAMILIAL HYPERCHOLESTEROLEMIA DNA ANALYSIS Not Detected        Orders Placed This Encounter   Procedures    Lipid panel    Comprehensive metabolic panel    Ambulatory Referral to Colorectal Surgery       ALLERGIES:  Allergies[5]    Current Medications   Current Medications[6]      Health Maintenance     Health Maintenance   Topic Date Due    Hepatitis C Screening  Never done    HIV Screening  Never done    HPV Vaccine (1 - 3-dose SCDM series) Never done    COVID-19 Vaccine (4 - 2024-25 season) 09/01/2024    Annual Physical  07/19/2025    Influenza Vaccine (1) 09/01/2025    Depression Screening  07/23/2026    Zoster Vaccine (1 of 2) 10/16/2031    DTaP,Tdap,and Td Vaccines (3 - Td or Tdap) 06/30/2033    Meningococcal B Vaccine  Aged Out    RSV Vaccine age 0-20 Months  Aged Out    Pneumococcal Vaccine: Pediatrics (0 to 5 Years) and At-Risk Patients (6 to 49 Years)  Aged Out    HIB Vaccine  Aged Out    IPV Vaccine  Aged Out    Hepatitis A Vaccine  Aged Out    Meningococcal ACWY Vaccine  Aged Out     Immunization History   Administered Date(s) Administered    COVID-19 PFIZER VACCINE 0.3 ML IM 12/21/2020, 01/09/2021, 11/13/2021    INFLUENZA " 10/30/2018, 10/02/2020, 10/06/2021    Influenza, seasonal, injectable 10/02/2013    Influenza, seasonal, injectable, preservative free 10/25/2024    Tdap 06/06/2012, 06/30/2023       Cresencio Erazo MD        Answers submitted by the patient for this visit:  Annual Physical (Submitted on 7/22/2025)  Diet/Nutrition choices: well balanced diet  Exercise choices: moderate cardiovascular exercise, 5-7 times a week on average, 30-60 minutes on average  Sleep choices: sleeps well, 7-8 hours of sleep on average  Hearing choices: normal hearing bilateral ears  Vision choices: most recent eye exam < 1 year ago, wears contacts  Dental choices: regular dental visits, brushes teeth twice daily, floss regularly  Do you currently have an OB/GYN provider that you routinely follow with?: No  Any history of sexual transmitted disease/infection?: No  Urinary symptoms: none  Do you have an advance directive/living will?: No  Do you have a durable power of  (POA)?: No         [1]   Patient Active Problem List  Diagnosis    Annual physical exam   [2]   Past Medical History:  Diagnosis Date    Allergic 2006    Seasonal- use flonase    COVID-19    [3]   Past Surgical History:  Procedure Laterality Date    SKIN BIOPSY      VASECTOMY     [4]   Family History  Problem Relation Name Age of Onset    No Known Problems Mother      Thyroid disease Father Abbe Moe     Hearing loss Maternal Grandfather Troy Victor     Hypertension Maternal Grandfather Troy Victor     Hyperlipidemia Maternal Grandfather Troy Victor     Heart disease Maternal Grandfather Troy Victor     Arthritis Paternal Grandmother Faby Moe     Dementia Paternal Grandmother Faby Moe     Hyperlipidemia Maternal Grandmother Julissa Victor     Hearing loss Maternal Grandmother Julissa Victor     Prostate cancer Paternal Grandfather Dalton Moe     Prostate cancer Paternal Uncle Pb oMe    [5] No Known Allergies  [6]   Current Outpatient Medications    Medication Sig Dispense Refill    fluticasone (FLONASE) 50 mcg/act nasal spray 1 spray into each nostril in the morning.       No current facility-administered medications for this visit.

## 2025-07-23 NOTE — ASSESSMENT & PLAN NOTE
Physical exam.  Overall the patient appears to be stable health.  He will obtain blood work as ordered for further evaluation.  Patient was given recommendation to see [facility] colorectal department for his history of some occasional rectal bleeding

## 2025-07-29 ENCOUNTER — APPOINTMENT (OUTPATIENT)
Dept: LAB | Facility: CLINIC | Age: 45
End: 2025-07-29
Attending: PREVENTIVE MEDICINE
Payer: COMMERCIAL

## 2025-07-29 ENCOUNTER — TRANSCRIBE ORDERS (OUTPATIENT)
Dept: LAB | Facility: CLINIC | Age: 45
End: 2025-07-29

## 2025-07-29 ENCOUNTER — APPOINTMENT (OUTPATIENT)
Dept: LAB | Facility: CLINIC | Age: 45
End: 2025-07-29
Payer: COMMERCIAL

## 2025-07-29 DIAGNOSIS — Z00.8 HEALTH EXAMINATION IN POPULATION SURVEYS: Primary | ICD-10-CM

## 2025-07-29 DIAGNOSIS — Z00.8 HEALTH EXAMINATION IN POPULATION SURVEYS: ICD-10-CM

## 2025-07-29 DIAGNOSIS — Z00.00 ANNUAL PHYSICAL EXAM: ICD-10-CM

## 2025-07-29 LAB
ALBUMIN SERPL BCG-MCNC: 4.1 G/DL (ref 3.5–5)
ALP SERPL-CCNC: 62 U/L (ref 34–104)
ALT SERPL W P-5'-P-CCNC: 11 U/L (ref 7–52)
ANION GAP SERPL CALCULATED.3IONS-SCNC: 9 MMOL/L (ref 4–13)
AST SERPL W P-5'-P-CCNC: 13 U/L (ref 13–39)
BILIRUB SERPL-MCNC: 0.79 MG/DL (ref 0.2–1)
BUN SERPL-MCNC: 16 MG/DL (ref 5–25)
CALCIUM SERPL-MCNC: 9.1 MG/DL (ref 8.4–10.2)
CHLORIDE SERPL-SCNC: 102 MMOL/L (ref 96–108)
CHOLEST SERPL-MCNC: 122 MG/DL (ref ?–200)
CO2 SERPL-SCNC: 28 MMOL/L (ref 21–32)
CREAT SERPL-MCNC: 0.86 MG/DL (ref 0.6–1.3)
EST. AVERAGE GLUCOSE BLD GHB EST-MCNC: 105 MG/DL
GFR SERPL CREATININE-BSD FRML MDRD: 106 ML/MIN/1.73SQ M
GLUCOSE P FAST SERPL-MCNC: 91 MG/DL (ref 65–99)
HBA1C MFR BLD: 5.3 %
HDLC SERPL-MCNC: 48 MG/DL
LDLC SERPL CALC-MCNC: 60 MG/DL (ref 0–100)
NONHDLC SERPL-MCNC: 74 MG/DL
POTASSIUM SERPL-SCNC: 4.4 MMOL/L (ref 3.5–5.3)
PROT SERPL-MCNC: 6.7 G/DL (ref 6.4–8.4)
SODIUM SERPL-SCNC: 139 MMOL/L (ref 135–147)
TRIGL SERPL-MCNC: 68 MG/DL (ref ?–150)

## 2025-07-29 PROCEDURE — 80053 COMPREHEN METABOLIC PANEL: CPT

## 2025-07-29 PROCEDURE — 83036 HEMOGLOBIN GLYCOSYLATED A1C: CPT

## 2025-07-29 PROCEDURE — 36415 COLL VENOUS BLD VENIPUNCTURE: CPT

## 2025-07-29 PROCEDURE — 80061 LIPID PANEL: CPT

## 2025-08-07 ENCOUNTER — PREP FOR PROCEDURE (OUTPATIENT)
Age: 45
End: 2025-08-07

## 2025-08-07 ENCOUNTER — TELEPHONE (OUTPATIENT)
Age: 45
End: 2025-08-07

## 2025-08-07 ENCOUNTER — OFFICE VISIT (OUTPATIENT)
Age: 45
End: 2025-08-07
Attending: FAMILY MEDICINE
Payer: COMMERCIAL

## 2025-08-07 VITALS — WEIGHT: 183 LBS | BODY MASS INDEX: 23.49 KG/M2 | HEIGHT: 74 IN

## 2025-08-07 DIAGNOSIS — K62.5 RECTAL BLEEDING: ICD-10-CM

## 2025-08-07 DIAGNOSIS — K62.5 RECTAL BLEEDING: Primary | ICD-10-CM

## 2025-08-07 DIAGNOSIS — S30.817A PERIANAL EXCORIATION: Primary | ICD-10-CM

## 2025-08-07 PROCEDURE — 46600 DIAGNOSTIC ANOSCOPY SPX: CPT | Performed by: COLON & RECTAL SURGERY

## 2025-08-07 PROCEDURE — 99243 OFF/OP CNSLTJ NEW/EST LOW 30: CPT | Performed by: COLON & RECTAL SURGERY
